# Patient Record
Sex: FEMALE | Race: WHITE | Employment: OTHER | URBAN - METROPOLITAN AREA
[De-identification: names, ages, dates, MRNs, and addresses within clinical notes are randomized per-mention and may not be internally consistent; named-entity substitution may affect disease eponyms.]

---

## 2017-03-23 ENCOUNTER — TELEPHONE (OUTPATIENT)
Dept: FAMILY MEDICINE CLINIC | Age: 72
End: 2017-03-23

## 2017-05-31 ENCOUNTER — TELEPHONE (OUTPATIENT)
Dept: FAMILY MEDICINE CLINIC | Age: 72
End: 2017-05-31

## 2017-07-10 ENCOUNTER — TELEPHONE (OUTPATIENT)
Dept: FAMILY MEDICINE CLINIC | Age: 72
End: 2017-07-10

## 2017-11-17 ENCOUNTER — TELEPHONE (OUTPATIENT)
Dept: FAMILY MEDICINE CLINIC | Age: 72
End: 2017-11-17

## 2017-11-17 NOTE — TELEPHONE ENCOUNTER
Dr Alfred Ornelas refused to sign [place] [employer] 54-FMA Medical Certification form.    Pt states she does not have a PCP because she has only seen dr Alfred Ornelas and again states that if this form is not filled out, then she will not have any power until the end of the month    Pt states she would come in to see dr Merrick Ramsay but she lives in [place] and is having car troubles    Pt asking dr Alfred Ornelas to reconsider     Form scanned under media

## 2017-11-17 NOTE — TELEPHONE ENCOUNTER
Patient called the office panicking because she has no power. [facility] faxed us over a form that needs to be completed and signed by Dr. Rupal Dhaliwal. Advised her that he is currently with patients and that we would give it to him to see if he would sign or not. States he is her PCP, patient states she will eventually make an appt with Dr. Arely Hampton. Gave forms to [facility] to ask Dr. Rupal Dhaliwal about. Advised patient we would contact her once completed or he refused to sign. Verbalized understanding.

## 2017-12-19 ENCOUNTER — TELEPHONE (OUTPATIENT)
Dept: FAMILY MEDICINE CLINIC | Age: 72
End: 2017-12-19

## 2017-12-19 DIAGNOSIS — J45.20 MILD INTERMITTENT ASTHMA WITHOUT COMPLICATION: ICD-10-CM

## 2017-12-19 RX ORDER — ALBUTEROL SULFATE 90 UG/1
2 AEROSOL, METERED RESPIRATORY (INHALATION) EVERY 6 HOURS PRN
Qty: 1 INHALER | Refills: 0 | Status: SHIPPED | OUTPATIENT
Start: 2017-12-19 | End: 2018-04-10 | Stop reason: SDUPTHER

## 2018-02-14 ENCOUNTER — TELEPHONE (OUTPATIENT)
Dept: FAMILY MEDICINE CLINIC | Age: 73
End: 2018-02-14

## 2018-04-10 ENCOUNTER — OFFICE VISIT (OUTPATIENT)
Dept: FAMILY MEDICINE CLINIC | Age: 73
End: 2018-04-10
Payer: MEDICARE

## 2018-04-10 VITALS
SYSTOLIC BLOOD PRESSURE: 125 MMHG | HEIGHT: 57 IN | DIASTOLIC BLOOD PRESSURE: 64 MMHG | WEIGHT: 133.6 LBS | TEMPERATURE: 99.5 F | OXYGEN SATURATION: 98 % | BODY MASS INDEX: 28.82 KG/M2 | HEART RATE: 86 BPM

## 2018-04-10 DIAGNOSIS — J45.20 MILD INTERMITTENT ASTHMA WITHOUT COMPLICATION: ICD-10-CM

## 2018-04-10 DIAGNOSIS — E55.9 VITAMIN D DEFICIENCY: ICD-10-CM

## 2018-04-10 DIAGNOSIS — E03.4 HYPOTHYROIDISM DUE TO ACQUIRED ATROPHY OF THYROID: ICD-10-CM

## 2018-04-10 DIAGNOSIS — R53.83 FATIGUE, UNSPECIFIED TYPE: Primary | ICD-10-CM

## 2018-04-10 DIAGNOSIS — E06.3 HASHIMOTO'S THYROIDITIS: ICD-10-CM

## 2018-04-10 DIAGNOSIS — R76.8 IGG GLIADIN ANTIBODY POSITIVE: ICD-10-CM

## 2018-04-10 DIAGNOSIS — E78.00 HYPERCHOLESTEROLEMIA: ICD-10-CM

## 2018-04-10 DIAGNOSIS — M16.0 PRIMARY OSTEOARTHRITIS OF BOTH HIPS: ICD-10-CM

## 2018-04-10 LAB
ALBUMIN SERPL-MCNC: 4.3 G/DL (ref 3.5–5.1)
ALP BLD-CCNC: 103 U/L (ref 38–126)
ALT SERPL-CCNC: 7 U/L (ref 11–66)
ANION GAP SERPL CALCULATED.3IONS-SCNC: 15 MEQ/L (ref 8–16)
AST SERPL-CCNC: 12 U/L (ref 5–40)
BASOPHILS # BLD: 0.7 %
BASOPHILS ABSOLUTE: 0 THOU/MM3 (ref 0–0.1)
BILIRUB SERPL-MCNC: 0.3 MG/DL (ref 0.3–1.2)
BILIRUBIN DIRECT: < 0.2 MG/DL (ref 0–0.3)
BUN BLDV-MCNC: 23 MG/DL (ref 7–22)
CALCIUM SERPL-MCNC: 9.6 MG/DL (ref 8.5–10.5)
CHLORIDE BLD-SCNC: 101 MEQ/L (ref 98–111)
CHOLESTEROL, TOTAL: 232 MG/DL (ref 100–199)
CO2: 25 MEQ/L (ref 23–33)
CREAT SERPL-MCNC: 0.7 MG/DL (ref 0.4–1.2)
EOSINOPHIL # BLD: 1.4 %
EOSINOPHILS ABSOLUTE: 0.1 THOU/MM3 (ref 0–0.4)
GFR SERPL CREATININE-BSD FRML MDRD: 82 ML/MIN/1.73M2
GLUCOSE BLD-MCNC: 104 MG/DL (ref 70–108)
HCT VFR BLD CALC: 41.7 % (ref 37–47)
HDLC SERPL-MCNC: 63 MG/DL
HEMOGLOBIN: 13.7 GM/DL (ref 12–16)
LDL CHOLESTEROL CALCULATED: 144 MG/DL
LYMPHOCYTES # BLD: 40.5 %
LYMPHOCYTES ABSOLUTE: 2.7 THOU/MM3 (ref 1–4.8)
MAGNESIUM: 2.1 MG/DL (ref 1.6–2.4)
MCH RBC QN AUTO: 29.6 PG (ref 27–31)
MCHC RBC AUTO-ENTMCNC: 32.9 GM/DL (ref 33–37)
MCV RBC AUTO: 89.8 FL (ref 81–99)
MONOCYTES # BLD: 8.1 %
MONOCYTES ABSOLUTE: 0.5 THOU/MM3 (ref 0.4–1.3)
NUCLEATED RED BLOOD CELLS: 0 /100 WBC
PDW BLD-RTO: 14.2 % (ref 11.5–14.5)
PLATELET # BLD: 308 THOU/MM3 (ref 130–400)
PMV BLD AUTO: 8.3 FL (ref 7.4–10.4)
POTASSIUM SERPL-SCNC: 4.1 MEQ/L (ref 3.5–5.2)
RBC # BLD: 4.64 MILL/MM3 (ref 4.2–5.4)
SEDIMENTATION RATE, ERYTHROCYTE: 14 MM/HR (ref 0–20)
SEG NEUTROPHILS: 49.3 %
SEGMENTED NEUTROPHILS ABSOLUTE COUNT: 3.3 THOU/MM3 (ref 1.8–7.7)
SODIUM BLD-SCNC: 141 MEQ/L (ref 135–145)
TOTAL PROTEIN: 7.8 G/DL (ref 6.1–8)
TRIGL SERPL-MCNC: 127 MG/DL (ref 0–199)
TSH SERPL DL<=0.05 MIU/L-ACNC: 6.2 UIU/ML (ref 0.4–4.2)
VITAMIN D 25-HYDROXY: 33 NG/ML (ref 30–100)
WBC # BLD: 6.6 THOU/MM3 (ref 4.8–10.8)

## 2018-04-10 PROCEDURE — 3017F COLORECTAL CA SCREEN DOC REV: CPT | Performed by: FAMILY MEDICINE

## 2018-04-10 PROCEDURE — G8427 DOCREV CUR MEDS BY ELIG CLIN: HCPCS | Performed by: FAMILY MEDICINE

## 2018-04-10 PROCEDURE — 99214 OFFICE O/P EST MOD 30 MIN: CPT | Performed by: FAMILY MEDICINE

## 2018-04-10 PROCEDURE — G8400 PT W/DXA NO RESULTS DOC: HCPCS | Performed by: FAMILY MEDICINE

## 2018-04-10 PROCEDURE — 3014F SCREEN MAMMO DOC REV: CPT | Performed by: FAMILY MEDICINE

## 2018-04-10 PROCEDURE — 1090F PRES/ABSN URINE INCON ASSESS: CPT | Performed by: FAMILY MEDICINE

## 2018-04-10 PROCEDURE — 1123F ACP DISCUSS/DSCN MKR DOCD: CPT | Performed by: FAMILY MEDICINE

## 2018-04-10 PROCEDURE — 36415 COLL VENOUS BLD VENIPUNCTURE: CPT | Performed by: FAMILY MEDICINE

## 2018-04-10 PROCEDURE — 1036F TOBACCO NON-USER: CPT | Performed by: FAMILY MEDICINE

## 2018-04-10 PROCEDURE — G8419 CALC BMI OUT NRM PARAM NOF/U: HCPCS | Performed by: FAMILY MEDICINE

## 2018-04-10 PROCEDURE — 4040F PNEUMOC VAC/ADMIN/RCVD: CPT | Performed by: FAMILY MEDICINE

## 2018-04-10 RX ORDER — ALBUTEROL SULFATE 90 UG/1
2 AEROSOL, METERED RESPIRATORY (INHALATION) EVERY 6 HOURS PRN
Qty: 1 INHALER | Refills: 0 | Status: SHIPPED | OUTPATIENT
Start: 2018-04-10 | End: 2018-07-10 | Stop reason: SDUPTHER

## 2018-04-10 ASSESSMENT — ENCOUNTER SYMPTOMS
TROUBLE SWALLOWING: 0
VOMITING: 0
DIARRHEA: 0
SHORTNESS OF BREATH: 1
ABDOMINAL PAIN: 0
BLOOD IN STOOL: 0
EYE PAIN: 0
NAUSEA: 0
CONSTIPATION: 0
COUGH: 1

## 2018-04-11 ENCOUNTER — TELEPHONE (OUTPATIENT)
Dept: FAMILY MEDICINE CLINIC | Age: 73
End: 2018-04-11

## 2018-04-11 LAB — THYROXINE (T4): 6.3 UG/DL (ref 4.5–12)

## 2018-04-12 LAB — DHEAS (DHEA SULFATE): 98 UG/DL (ref 10–90)

## 2018-04-13 LAB — ANA SCREEN: NORMAL

## 2018-04-14 LAB — DHEA UNCONJUGATED: 2.12 NG/ML (ref 0.63–4.7)

## 2018-07-10 DIAGNOSIS — R53.83 FATIGUE, UNSPECIFIED TYPE: ICD-10-CM

## 2018-07-10 DIAGNOSIS — J45.20 MILD INTERMITTENT ASTHMA WITHOUT COMPLICATION: ICD-10-CM

## 2018-07-11 RX ORDER — ALBUTEROL SULFATE 90 UG/1
2 AEROSOL, METERED RESPIRATORY (INHALATION) EVERY 6 HOURS PRN
Qty: 1 INHALER | Refills: 0 | Status: SHIPPED | OUTPATIENT
Start: 2018-07-11 | End: 2018-09-04 | Stop reason: SDUPTHER

## 2018-07-11 NOTE — TELEPHONE ENCOUNTER
Phone: [phone] [facility] Phone) [address] [facility] Phone: None,  Cant leave voicemail. Send my chart.

## 2018-07-11 NOTE — TELEPHONE ENCOUNTER
Phone: [phone] [facility] Phone) [address] [facility] Phone: None, SPOKE with Malina Bulalrd. Patient states no future questions or concerns at this time.

## 2018-08-22 ENCOUNTER — OFFICE VISIT (OUTPATIENT)
Dept: FAMILY MEDICINE CLINIC | Age: 73
End: 2018-08-22
Payer: MEDICARE

## 2018-08-22 ENCOUNTER — HOSPITAL ENCOUNTER (OUTPATIENT)
Age: 73
Discharge: HOME | End: 2018-08-22
Payer: MEDICARE

## 2018-08-22 ENCOUNTER — HOSPITAL ENCOUNTER (OUTPATIENT)
Dept: CT IMAGING | Age: 73
Discharge: HOME | End: 2018-08-22
Payer: MEDICARE

## 2018-08-22 VITALS
TEMPERATURE: 98.4 F | WEIGHT: 137 LBS | BODY MASS INDEX: 29.56 KG/M2 | OXYGEN SATURATION: 98 % | RESPIRATION RATE: 12 BRPM | DIASTOLIC BLOOD PRESSURE: 60 MMHG | HEART RATE: 69 BPM | SYSTOLIC BLOOD PRESSURE: 136 MMHG | HEIGHT: 57 IN

## 2018-08-22 DIAGNOSIS — R20.0 NUMBNESS AND TINGLING IN LEFT HAND: Primary | ICD-10-CM

## 2018-08-22 DIAGNOSIS — R00.2 HEART PALPITATIONS: ICD-10-CM

## 2018-08-22 DIAGNOSIS — R20.0 NUMBNESS AND TINGLING IN LEFT HAND: ICD-10-CM

## 2018-08-22 DIAGNOSIS — H53.9 VISION CHANGES: ICD-10-CM

## 2018-08-22 DIAGNOSIS — R94.31 ABNORMAL EKG: ICD-10-CM

## 2018-08-22 DIAGNOSIS — E78.2 MIXED HYPERLIPIDEMIA: ICD-10-CM

## 2018-08-22 DIAGNOSIS — R42 DIZZINESS: ICD-10-CM

## 2018-08-22 DIAGNOSIS — R20.0 NUMBNESS OF LEFT FOOT: ICD-10-CM

## 2018-08-22 DIAGNOSIS — R20.2 NUMBNESS AND TINGLING IN LEFT HAND: Primary | ICD-10-CM

## 2018-08-22 DIAGNOSIS — R20.2 NUMBNESS AND TINGLING IN LEFT HAND: ICD-10-CM

## 2018-08-22 DIAGNOSIS — R79.89 ELEVATED D-DIMER: ICD-10-CM

## 2018-08-22 LAB
ANION GAP SERPL CALCULATED.3IONS-SCNC: 14 MEQ/L (ref 8–16)
BASOPHILS # BLD: 0.9 %
BASOPHILS ABSOLUTE: 0.1 THOU/MM3 (ref 0–0.1)
BUN BLDV-MCNC: 13 MG/DL (ref 7–22)
CALCIUM SERPL-MCNC: 9 MG/DL (ref 8.5–10.5)
CHLORIDE BLD-SCNC: 102 MEQ/L (ref 98–111)
CHOLESTEROL, TOTAL: 235 MG/DL (ref 100–199)
CO2: 23 MEQ/L (ref 23–33)
CREAT SERPL-MCNC: 0.6 MG/DL (ref 0.4–1.2)
EOSINOPHIL # BLD: 1.8 %
EOSINOPHILS ABSOLUTE: 0.1 THOU/MM3 (ref 0–0.4)
ERYTHROCYTE [DISTWIDTH] IN BLOOD BY AUTOMATED COUNT: 13.9 % (ref 11.5–14.5)
ERYTHROCYTE [DISTWIDTH] IN BLOOD BY AUTOMATED COUNT: 46.3 FL (ref 35–45)
GFR SERPL CREATININE-BSD FRML MDRD: > 90 ML/MIN/1.73M2
GLUCOSE BLD-MCNC: 90 MG/DL (ref 70–108)
HCT VFR BLD CALC: 39.8 % (ref 37–47)
HDLC SERPL-MCNC: 66 MG/DL
HEMOGLOBIN: 13.5 GM/DL (ref 12–16)
IMMATURE GRANS (ABS): 0.02 THOU/MM3 (ref 0–0.07)
IMMATURE GRANULOCYTES: 0.3 %
LDL CHOLESTEROL CALCULATED: 153 MG/DL
LYMPHOCYTES # BLD: 41.6 %
LYMPHOCYTES ABSOLUTE: 2.9 THOU/MM3 (ref 1–4.8)
MAGNESIUM: 2 MG/DL (ref 1.6–2.4)
MCH RBC QN AUTO: 30.7 PG (ref 26–33)
MCHC RBC AUTO-ENTMCNC: 33.9 GM/DL (ref 32.2–35.5)
MCV RBC AUTO: 90.5 FL (ref 81–99)
MONOCYTES # BLD: 7.8 %
MONOCYTES ABSOLUTE: 0.5 THOU/MM3 (ref 0.4–1.3)
NUCLEATED RED BLOOD CELLS: 0 /100 WBC
PLATELET # BLD: 304 THOU/MM3 (ref 130–400)
PMV BLD AUTO: 10 FL (ref 9.4–12.4)
POTASSIUM SERPL-SCNC: 3.6 MEQ/L (ref 3.5–5.2)
RBC # BLD: 4.4 MILL/MM3 (ref 4.2–5.4)
SEG NEUTROPHILS: 47.6 %
SEGMENTED NEUTROPHILS ABSOLUTE COUNT: 3.3 THOU/MM3 (ref 1.8–7.7)
SODIUM BLD-SCNC: 139 MEQ/L (ref 135–145)
T4 FREE: 0.65 NG/DL (ref 0.93–1.76)
TRIGL SERPL-MCNC: 82 MG/DL (ref 0–199)
TSH SERPL DL<=0.05 MIU/L-ACNC: 11.17 UIU/ML (ref 0.4–4.2)
WBC # BLD: 7 THOU/MM3 (ref 4.8–10.8)

## 2018-08-22 PROCEDURE — G8510 SCR DEP NEG, NO PLAN REQD: HCPCS | Performed by: NURSE PRACTITIONER

## 2018-08-22 PROCEDURE — 84443 ASSAY THYROID STIM HORMONE: CPT

## 2018-08-22 PROCEDURE — 80048 BASIC METABOLIC PNL TOTAL CA: CPT

## 2018-08-22 PROCEDURE — 84439 ASSAY OF FREE THYROXINE: CPT

## 2018-08-22 PROCEDURE — 99214 OFFICE O/P EST MOD 30 MIN: CPT | Performed by: NURSE PRACTITIONER

## 2018-08-22 PROCEDURE — 85025 COMPLETE CBC W/AUTO DIFF WBC: CPT

## 2018-08-22 PROCEDURE — 86141 C-REACTIVE PROTEIN HS: CPT

## 2018-08-22 PROCEDURE — 70450 CT HEAD/BRAIN W/O DYE: CPT

## 2018-08-22 PROCEDURE — G8400 PT W/DXA NO RESULTS DOC: HCPCS | Performed by: NURSE PRACTITIONER

## 2018-08-22 PROCEDURE — 1036F TOBACCO NON-USER: CPT | Performed by: NURSE PRACTITIONER

## 2018-08-22 PROCEDURE — 83735 ASSAY OF MAGNESIUM: CPT

## 2018-08-22 PROCEDURE — 1123F ACP DISCUSS/DSCN MKR DOCD: CPT | Performed by: NURSE PRACTITIONER

## 2018-08-22 PROCEDURE — G8427 DOCREV CUR MEDS BY ELIG CLIN: HCPCS | Performed by: NURSE PRACTITIONER

## 2018-08-22 PROCEDURE — 4040F PNEUMOC VAC/ADMIN/RCVD: CPT | Performed by: NURSE PRACTITIONER

## 2018-08-22 PROCEDURE — 3017F COLORECTAL CA SCREEN DOC REV: CPT | Performed by: NURSE PRACTITIONER

## 2018-08-22 PROCEDURE — G8419 CALC BMI OUT NRM PARAM NOF/U: HCPCS | Performed by: NURSE PRACTITIONER

## 2018-08-22 PROCEDURE — 1090F PRES/ABSN URINE INCON ASSESS: CPT | Performed by: NURSE PRACTITIONER

## 2018-08-22 PROCEDURE — 36415 COLL VENOUS BLD VENIPUNCTURE: CPT

## 2018-08-22 PROCEDURE — 80061 LIPID PANEL: CPT

## 2018-08-22 PROCEDURE — 1101F PT FALLS ASSESS-DOCD LE1/YR: CPT | Performed by: NURSE PRACTITIONER

## 2018-08-22 PROCEDURE — 93000 ELECTROCARDIOGRAM COMPLETE: CPT | Performed by: NURSE PRACTITIONER

## 2018-08-22 ASSESSMENT — PATIENT HEALTH QUESTIONNAIRE - PHQ9
2. FEELING DOWN, DEPRESSED OR HOPELESS: 0
SUM OF ALL RESPONSES TO PHQ QUESTIONS 1-9: 0
SUM OF ALL RESPONSES TO PHQ QUESTIONS 1-9: 0
SUM OF ALL RESPONSES TO PHQ9 QUESTIONS 1 & 2: 0
1. LITTLE INTEREST OR PLEASURE IN DOING THINGS: 0

## 2018-08-22 NOTE — PATIENT INSTRUCTIONS
emergency department      Patient Education        Cardiac Arrhythmia: Care Instructions  Your Care Instructions    A cardiac arrhythmia is a change in the normal rhythm of the heart. Your heart may beat too fast or too slow or beat with an irregular or skipping rhythm. A change in the heart's rhythm may feel like a really strong heartbeat or a fluttering in your chest. A severe heart rhythm problem can keep the body from getting the blood it needs. This can result in shortness of breath, lightheadedness, and fainting. You may take medicine to treat your condition. Your doctor may recommend a pacemaker or recommend catheter ablation to destroy small parts of the heart that are causing a rhythm problem. Another possible treatment is an implantable cardioverter-defibrillator (ICD). An ICD is a device that gives the heart a shock to return the heart to a normal rhythm. Follow-up care is a key part of your treatment and safety. Be sure to make and go to all appointments, and call your doctor if you are having problems. It's also a good idea to know your test results and keep a list of the medicines you take. How can you care for yourself at home?  [facility]    · Be safe with medicines. Take your medicines exactly as prescribed. Call your doctor if you think you are having a problem with your medicine. You will get more details on the specific medicines your doctor prescribes.     · If you received a pacemaker or an ICD, you will get a fact sheet about it.     · Wear medical alert jewelry that says you have an abnormal heart rhythm. You can buy this at most drugstores.    Lifestyle changes    · Eat a heart-healthy diet.     · Stay at a healthy weight. Lose weight if you need to.     · Avoid nicotine, too much alcohol, and illegal drugs (meth, speed, and cocaine). Also, get enough sleep and do not overeat.     · Ask your doctor whether you can take over-the-counter medicines (such as decongestants).  These can make your heart beat fast.     · Talk to your doctor about any limits to activities, such as driving, or tasks where you use power tools or ladders. Activity    · Start light exercise if your doctor says you can. Even a small amount will help you get stronger, have more energy, and manage your stress.     · Get regular exercise. Try for 30 minutes on most days of the week. Ask your doctor what level of exercise is safe for you. If activity is not likely to cause health problems, you probably do not have limits on the type or level of activity that you can do. You may want to walk, swim, bike, or do other activities.     · When you exercise, watch for signs that your heart is working too hard. You are pushing too hard if you cannot talk while you exercise. If you become short of breath or dizzy or have chest pain, sit down and rest.     · Check your pulse daily. Place two fingers on the artery at the palm side of your wrist, in line with your thumb. If your heartbeat seems uneven, talk to your doctor. When should you call for help? Call 911 anytime you think you may need emergency care. For example, call if:    · You have symptoms of sudden heart failure. These may include:  ¨ Severe trouble breathing. ¨ A fast or irregular heartbeat. ¨ Coughing up pink, foamy mucus. ¨ You passed out.     · You have signs of a stroke. These include:  ¨ Sudden numbness, paralysis, or weakness in your face, arm, or leg, especially on only one side of your body. ¨ New problems with walking or balance. ¨ Sudden vision changes. ¨ Drooling or slurred speech. ¨ New problems speaking or understanding simple statements, or feeling confused. ¨ A sudden, severe headache that is different from past headaches.    Call your doctor now or seek immediate medical care if:    · You have new or changed symptoms of heart failure, such as:  ¨ New or increased shortness of breath. ¨ New or worse swelling in your legs, ankles, or feet.   ¨ Sudden weight gain, such as more than 2 to 3 pounds in a day or 5 pounds in a week. (Your doctor may suggest a different range of weight gain.)  ¨ Feeling dizzy or lightheaded or like you may faint. ¨ Feeling so tired or weak that you cannot do your usual activities. ¨ Not sleeping well. Shortness of breath wakes you at night. You need extra pillows to prop yourself up to breathe easier.    Watch closely for changes in your health, and be sure to contact your doctor if:    · You do not get better as expected. Where can you learn more? Go to https://chpepiceweb.Carmot Therapeutics. org and sign in to your Stretchr account. Enter F172 in the Talkspace box to learn more about \"Cardiac Arrhythmia: Care Instructions. \"     If you do not have an account, please click on the \"Sign Up Now\" link. Current as of: December 6, 2017  Content Version: 11.7  © 7954-5628 Verimed. Care instructions adapted under license by [facility] If you have questions about a medical condition or this instruction, always ask your healthcare professional. [place] [address] any warranty or liability for your use of this information. Patient Education        Dizziness: Care Instructions  Your Care Instructions  Dizziness is the feeling of unsteadiness or fuzziness in your head. It is different than having vertigo, which is a feeling that the room is spinning or that you are moving or falling. It is also different from lightheadedness, which is the feeling that you are about to faint. It can be hard to know what causes dizziness. Some people feel dizzy when they have migraine headaches. Sometimes bouts of flu can make you feel dizzy. Some medical conditions, such as heart problems or high blood pressure, can make you feel dizzy. Many medicines can cause dizziness, including medicines for high blood pressure, pain, or anxiety.   If a medicine causes your symptoms, your doctor may recommend that you walking or balance. ¨ A sudden, severe headache that is different from past headaches.    Call your doctor now or seek immediate medical care if:    · You feel dizzy and have a fever, headache, or ringing in your ears.     · You have new or increased nausea and vomiting.     · Your dizziness does not go away or comes back.    Watch closely for changes in your health, and be sure to contact your doctor if:    · You do not get better as expected. Where can you learn more? Go to https://DeeplinkpeTissue Regenix.[facility]inic [facility]. org and sign in to your Hoodin account. Enter I920 in the Plaxica box to learn more about \"Dizziness: Care Instructions. \"     If you do not have an account, please click on the \"Sign Up Now\" link. Current as of: November 20, 2017  Content Version: 11.7  © 3759-6841 Cell Gate USA. Care instructions adapted under license by [facility] ([facility]). If you have questions about a medical condition or this instruction, always ask your healthcare professional. [place] [address] any warranty or liability for your use of this information. Patient Education        High Cholesterol: Care Instructions  Your Care Instructions    Cholesterol is a type of fat in your blood. It is needed for many body functions, such as making new cells. Cholesterol is made by your body. It also comes from food you eat. High cholesterol means that you have too much of the fat in your blood. This raises your risk of a heart attack and stroke. LDL and HDL are part of your total cholesterol. LDL is the \"bad\" cholesterol. High LDL can raise your risk for heart disease, heart attack, and stroke. HDL is the \"good\" cholesterol. It helps clear bad cholesterol from the body. High HDL is linked with a lower risk of heart disease, heart attack, and stroke. Your cholesterol levels help your doctor find out your risk for having a heart attack or stroke.  You and your doctor can talk about whether you need to lower your risk and what treatment is best for you. A heart-healthy lifestyle along with medicines can help lower your cholesterol and your risk. The way you choose to lower your risk will depend on how high your risk is for heart attack and stroke. It will also depend on how you feel about taking medicines. Follow-up care is a key part of your treatment and safety. Be sure to make and go to all appointments, and call your doctor if you are having problems. It's also a good idea to know your test results and keep a list of the medicines you take. How can you care for yourself at home? · Eat a variety of foods every day. Good choices include fruits, vegetables, whole grains (like oatmeal), dried beans and peas, nuts and seeds, soy products (like tofu), and fat-free or low-fat dairy products. · Replace butter, margarine, and hydrogenated or partially hydrogenated oils with olive and canola oils. (Canola oil margarine without trans fat is fine.)  · Replace red meat with fish, poultry, and soy protein (like tofu). · Limit processed and packaged foods like chips, crackers, and cookies. · Bake, broil, or steam foods. Don't cagle them. · Be physically active. Get at least 30 minutes of exercise on most days of the week. Walking is a good choice. You also may want to do other activities, such as running, swimming, cycling, or playing tennis or team sports. · Stay at a healthy weight or lose weight by making the changes in eating and physical activity listed above. Losing just a small amount of weight, even 5 to 10 pounds, can reduce your risk for having a heart attack or stroke. · Do not smoke. When should you call for help? Watch closely for changes in your health, and be sure to contact your doctor if:    · You need help making lifestyle changes.     · You have questions about your medicine. Where can you learn more? Go to https://magda.healthCertify. org and sign in to your [employer] account.  Enter J730 in the [facility] box to learn more about \"High Cholesterol: Care Instructions. \"     If you do not have an account, please click on the \"Sign Up Now\" link. Current as of: May 10, 2017  Content Version: 11.7  © 0339-1303 VC4Africa, [email or web address]. Care instructions adapted under license by [facility] ([facility]). If you have questions about a medical condition or this instruction, always ask your healthcare professional. [address] any warranty or liability for your use of this information. Patient Education        Lightheadedness or Faintness: Care Instructions  Your Care Instructions  Lightheadedness is a feeling that you are about to faint or \"pass out. \" You do not feel as if you or your surroundings are moving. It is different from vertigo, which is the feeling that you or things around you are spinning or tilting. Lightheadedness usually goes away or gets better when you lie down. If lightheadedness gets worse, it can lead to a fainting spell. It is common to feel lightheaded from time to time. Lightheadedness usually is not caused by a serious problem. It often is caused by a short-lasting drop in blood pressure and blood flow to your head that occurs when you get up too quickly from a seated or lying position. Follow-up care is a key part of your treatment and safety. Be sure to make and go to all appointments, and call your doctor if you are having problems. It's also a good idea to know your test results and keep a list of the medicines you take. How can you care for yourself at home? · Lie down for 1 or 2 minutes when you feel lightheaded. After lying down, sit up slowly and remain sitting for 1 to 2 minutes before slowly standing up. · Avoid movements, positions, or activities that have made you lightheaded in the past.  · Get plenty of rest, especially if you have a cold or flu, which can cause lightheadedness.   · Make sure you drink plenty of fluids, especially if you have a fever or have been sweating. · Do not drive or put yourself and others in danger while you feel lightheaded. When should you call for help? Call 911 anytime you think you may need emergency care. For example, call if:    · You have symptoms of a stroke. These may include:  ¨ Sudden numbness, tingling, weakness, or loss of movement in your face, arm, or leg, especially on only one side of your body. ¨ Sudden vision changes. ¨ Sudden trouble speaking. ¨ Sudden confusion or trouble understanding simple statements. ¨ Sudden problems with walking or balance. ¨ A sudden, severe headache that is different from past headaches.     · You have symptoms of a heart attack. These may include:  ¨ Chest pain or pressure, or a strange feeling in the chest.  ¨ Sweating. ¨ Shortness of breath. ¨ Nausea or vomiting. ¨ Pain, pressure, or a strange feeling in the back, neck, jaw, or upper belly or in one or both shoulders or arms. ¨ Lightheadedness or sudden weakness. ¨ A fast or irregular heartbeat. After you call 911, the  may tell you to chew 1 adult-strength or 2 to 4 low-dose aspirin. Wait for an ambulance. Do not try to drive yourself.    Watch closely for changes in your health, and be sure to contact your doctor if:    · Your lightheadedness gets worse or does not get better with home care. Where can you learn more? Go to https://Wirecom TechnologiespeEvent 38 Unmanned Technology.EdgeWave Inc.. org and sign in to your [employer] Reed [employer]illery account. Enter P150 in the "Tapshot, Makers of Videokits"[facility] box to learn more about \"Lightheadedness or Faintness: Care Instructions. \"     If you do not have an account, please click on the \"Sign Up Now\" link. Current as of: November 20, 2017  Content Version: 11.7  © 9374-6630 BlueBat Games, Incorporated. Care instructions adapted under license by [facility] ([facility]).  If you have questions about a medical condition or this instruction, always ask your healthcare professional. [address] changes. ¨ Sudden trouble speaking. ¨ Sudden confusion or trouble understanding simple statements. ¨ Sudden problems with walking or balance. ¨ A sudden, severe headache that is different from past headaches.    Watch closely for changes in your health, and be sure to contact your doctor if you have any problems, or if:    · You do not get better as expected. Where can you learn more? Go to https://Ginger.iopechiragewcarlos.Reflectance Medical. org and sign in to your IntelligentMDx account. Enter Z401 in the Unique Solutions box to learn more about \"Numbness and Tingling: Care Instructions. \"     If you do not have an account, please click on the \"Sign Up Now\" link. Current as of: September 10, 2017  Content Version: 11.7  © 1962-8359 CamSemi, Incorporated. Care instructions adapted under license by [facility] ([facility]). If you have questions about a medical condition or this instruction, always ask your healthcare professional. [place] [address] any warranty or liability for your use of this information.

## 2018-08-22 NOTE — PROGRESS NOTES
[address] [address] [facility] [address] [facility]  Abhay Harvey  Dept: [phone]  Dept Fax: [phone]: [phone]    Visit Date: 8/22/2018    Lynda Mckeon is a 67 y.o. female who presents today for:  Chief Complaint   Patient presents with    Numbness     left arm, feet    Palpitations    Gait Problem     HPI:     Brandon Frankel has had left hand numbness for the past couple of weeks - this is there all the time now it is going into the top of her left foot into the lower leg starting yesterday. She has noticed that she is off balance - this occurs while she is walking/standing. This has been going on for a couple of months. She feels like her vision is changing, not blurry. This has been going on for the past week or so. She notices it more when she is trying to read. Denies headaches. No injury to the neck or upper extremities. She had times that she felt like she was blacking out - does not lose consciousness just feels like she is blacking out. Also, she is having palpitations. This occurs once every few weeks. Last episode was yesterday, will last a few minutes. Denies chest pain or shortness of breath. She is afraid that she had a mini stroke. HPI  Health Maintenance   Topic Date Due    DTaP/Tdap/Td vaccine (1 - Tdap) 02/09/1994    Breast cancer screen  08/26/1994    Shingles Vaccine (1 of 2 - 2 Dose Series) 08/26/1994    Colon cancer screen colonoscopy  08/26/1994    DEXA (modify frequency per FRAX score)  08/26/2009    Pneumococcal low/med risk (1 of 2 - PCV13) 08/26/2009    A1C test (Diabetic or Prediabetic)  12/10/2016    Flu vaccine (1) 09/01/2018    TSH testing  04/10/2019    Lipid screen  04/10/2023       No past medical history on file.    Past Surgical History:   Procedure Laterality Date    APPENDECTOMY      FOOT SURGERY Right     toothpick in foot    HYSTERECTOMY      TONSILLECTOMY       No family history on file. Social History   Substance Use Topics    Smoking status: Never Smoker    Smokeless tobacco: Never Used    Alcohol use No      Current Outpatient Prescriptions   Medication Sig Dispense Refill    albuterol sulfate  (90 Base) MCG/ACT inhaler Inhale 2 puffs into the lungs every 6 hours as needed for Wheezing 1 Inhaler 0    vitamin D (CHOLECALCIFEROL) 5000 UNITS CAPS capsule Take 5,000 Units by mouth daily      b complex vitamins capsule Take 1 capsule by mouth daily B-75 two phase by Solaray       MAGNESIUM CITRATE PO Take 200 mg by mouth 4 times daily (with meals and nightly) Must be TABLET form to not cause diarrhea, Vitacost, Vitamin Shoppe, Solgar, Now      b complex vitamins capsule Take 1 capsule by mouth daily B-12 plus by Mckenna Hurtado Cyanocobalamin (B-12 PO) Take 1 tablet by mouth daily b-12 energy berrymelt      NONFORMULARY Ultimate H.A. 3 capsules daily      NONFORMULARY AstaFX 2 tablets daily      Omega-3 Krill Oil 500 MG CAPS Take 2 tablets by mouth 3 times daily (before meals) [facility][id number] takes 1 cap 4 times a day for knee pain      Calcium Citrate-Vitamin D (CALCIUM CITRATE + PO) Take 2 tablets by mouth daily        No current facility-administered medications for this visit. No Known Allergies    Subjective:    Review of Systems    Objective:     Vitals:    08/22/18 1354   BP: 136/60   Site: Right Arm   Position: Sitting   Cuff Size: Medium Adult   Pulse: 69   Resp: 12   Temp: 98.4 °F (36.9 °C)   TempSrc: Oral   SpO2: 98%   Weight: 137 lb (62.1 kg)   Height: 4' 9.48\" (1.46 m)     Body mass index is 29.15 kg/m². Wt Readings from Last 3 Encounters:   08/22/18 137 lb (62.1 kg)   04/10/18 133 lb 9.6 oz (60.6 kg)   08/23/16 143 lb 6.4 oz (65 kg)     BP Readings from Last 3 Encounters:   08/22/18 136/60   04/10/18 125/64   08/23/16 101/63     Physical Exam   Constitutional: She is oriented to person, place, and time.  She appears well-developed and well-nourished. No distress. HENT:   Head: Normocephalic and atraumatic. Right Ear: Hearing and external ear normal. No swelling. Left Ear: Hearing and external ear normal. No swelling. Nose: No mucosal edema or rhinorrhea. Right sinus exhibits no maxillary sinus tenderness and no frontal sinus tenderness. Left sinus exhibits no maxillary sinus tenderness and no frontal sinus tenderness. Mouth/Throat: Oropharynx is clear and moist. No oropharyngeal exudate or posterior oropharyngeal erythema. Eyes: Pupils are equal, round, and reactive to light. Conjunctivae are normal. Right eye exhibits no discharge. Left eye exhibits no discharge. Neck: Normal range of motion. Cardiovascular:   No lower extremity edema   Pulmonary/Chest: Effort normal. No respiratory distress. Abdominal: She exhibits no distension. Musculoskeletal: She exhibits no tenderness or deformity. Full ROM of upper and lower extremities    Lymphadenopathy:     She has no cervical adenopathy. Neurological: She is alert and oriented to person, place, and time. Coordination and gait normal.   Skin: Skin is warm and dry. No rash noted. She is not diaphoretic. No cyanosis. Nails show no clubbing. Psychiatric: She has a normal mood and affect. Her speech is normal and behavior is normal. Judgment and thought content normal. Cognition and memory are normal.     Lab Results   Component Value Date    WBC 6.6 04/10/2018    HGB 13.7 04/10/2018    HCT 41.7 04/10/2018     04/10/2018    CHOL 232 (H) 04/10/2018    TRIG 127 04/10/2018    HDL 63 04/10/2018    LDLCALC 144 04/10/2018    AST 12 04/10/2018     04/10/2018    K 4.1 04/10/2018     04/10/2018    CREATININE 0.7 04/10/2018    BUN 23 (H) 04/10/2018    CO2 25 04/10/2018    TSH 6.200 (H) 04/10/2018    LABA1C 5.9 12/10/2015    LABGLOM 82 (A) 04/10/2018    MG 2.1 04/10/2018    CALCIUM 9.6 04/10/2018    VITD25 33 04/10/2018     Assessment:       Diagnosis Orders   1.  Numbness  CT HEAD WO CONTRAST    Basic Metabolic Panel    CBC Auto Differential    TSH with Reflex    Magnesium    High sensitivity CRP    Lipid Panel    D-Dimer, Quantitative    [facility] Zainab Roa MD    Holter Monitor 48 Hour    EKG 12 Lead     Medications Prescribed:  No orders of the defined types were placed in this encounter. Future Appointments  Date Time Provider Sukumar Yumiko   9/4/2018 2:00 PM DO CLAY Cifuentes [facility]   4/15/2019 1:00 PM DO CLAY Cifuentes  RES [address]        Patient given educational materials - see patient instructions. Discussed use, benefit, and side effects of prescribed medications. All patient questions answered. Pt voiced understanding. Reviewed health maintenance. Instructed to continue current medications, diet and exercise. Patient agreed with treatment plan. Follow up as directed.      Electronically signed by NAVI Lemus CNP on 8/22/2018 at 3:49 PM

## 2018-08-23 ENCOUNTER — TELEPHONE (OUTPATIENT)
Dept: FAMILY MEDICINE CLINIC | Age: 73
End: 2018-08-23

## 2018-08-23 DIAGNOSIS — E03.9 ACQUIRED HYPOTHYROIDISM: Primary | ICD-10-CM

## 2018-08-23 RX ORDER — LEVOTHYROXINE SODIUM 0.1 MG/1
100 TABLET ORAL DAILY
Qty: 30 TABLET | Refills: 1 | Status: SHIPPED | OUTPATIENT
Start: 2018-08-23 | End: 2018-09-04

## 2018-08-23 NOTE — TELEPHONE ENCOUNTER
Called Phone: [phone] (Mobile)Pref Phone: None,     Patients last appointment was : 8/22/2018  Patients next appointment is : 9/4/2018 - will keep to see Dr. Tyshawn Obando in 2 weeks. Edmundo Akhtar recommend verbally- The heart monitor, she will see Dr. Tyshawn Obando and see if she wants to continue with that order. Alistair Cast will wait the two weeks. Cholesterol:    · Eat heart-healthy foods. ? Eat fruits, vegetables, whole grains (like oatmeal), dried beans and peas, nuts and seeds, soy products (like tofu), and fat-free or low-fat dairy products. ? Replace butter, margarine, and hydrogenated or partially hydrogenated oils with olive and canola oils. (Canola oil margarine without trans fat is fine.)  ? Replace red meat with fish, poultry, and soy protein (like tofu). ? Limit processed and packaged foods like chips, crackers, and cookies. · Be active. Exercise can improve your cholesterol level. Get at least 30 minutes of exercise on most days of the week. Walking is a good choice. You also may want to do other activities, such as running, swimming, cycling, or playing tennis or team sports. · Stay at a healthy weight. Lose weight if you need to. · Don't smoke. If you need help quitting, talk to your doctor about stop-smoking programs and medicines. These can increase your chances of quitting for good.

## 2018-08-23 NOTE — TELEPHONE ENCOUNTER
Feliciano More would like a referral placed for nutrion. She has been following a low cholesterol diet, exercise 30 minutes daily, and can take an OTC fish oil. Please advise.

## 2018-08-23 NOTE — TELEPHONE ENCOUNTER
Called Phone: [phone] (Mobile)Pref Phone: None, spoke with Malina Bullard,  She states her family does not have cholesterol issues and that they are all dead and she cant ask and find out but her children have never complained of it. She will try to the low cholesterol and fish oil 4 times daily. She currently has fish oil 1200 mg 650  DHA and other 100. She stated in 2016 Dr. Nalini Brown did not have problem with that fish oil. She will finish the 3 bottle she owns and possible get the 1000 mg [facility] brand. She will take 2 capsule = 1,200 with each mean and 1 capsule at dinner = 600 = totaling 4200   . Patients last appointment was : 8/22/2018  Patients next appointment is : 9/4/2018  Patient states no future questions or concerns at this time.

## 2018-08-25 LAB — C-REACTIVE PROTEIN, HIGH SENSITIVITY: 5.8 MG/L

## 2018-09-04 ENCOUNTER — OFFICE VISIT (OUTPATIENT)
Dept: FAMILY MEDICINE CLINIC | Age: 73
End: 2018-09-04
Payer: MEDICARE

## 2018-09-04 VITALS
DIASTOLIC BLOOD PRESSURE: 64 MMHG | TEMPERATURE: 98.4 F | WEIGHT: 144.8 LBS | HEART RATE: 68 BPM | OXYGEN SATURATION: 98 % | HEIGHT: 57 IN | SYSTOLIC BLOOD PRESSURE: 126 MMHG | BODY MASS INDEX: 31.24 KG/M2

## 2018-09-04 DIAGNOSIS — J45.20 MILD INTERMITTENT ASTHMA WITHOUT COMPLICATION: ICD-10-CM

## 2018-09-04 DIAGNOSIS — Z78.0 POSTMENOPAUSAL: ICD-10-CM

## 2018-09-04 DIAGNOSIS — R20.0 NUMBNESS OF FOOT: ICD-10-CM

## 2018-09-04 DIAGNOSIS — Z78.0 POST-MENOPAUSAL: Primary | ICD-10-CM

## 2018-09-04 DIAGNOSIS — E03.9 ACQUIRED HYPOTHYROIDISM: ICD-10-CM

## 2018-09-04 DIAGNOSIS — L67.8 ABNORMAL FACIAL HAIR: ICD-10-CM

## 2018-09-04 DIAGNOSIS — Z13.820 SCREENING FOR OSTEOPOROSIS: ICD-10-CM

## 2018-09-04 DIAGNOSIS — R53.83 FATIGUE, UNSPECIFIED TYPE: ICD-10-CM

## 2018-09-04 PROCEDURE — 4040F PNEUMOC VAC/ADMIN/RCVD: CPT | Performed by: FAMILY MEDICINE

## 2018-09-04 PROCEDURE — 1090F PRES/ABSN URINE INCON ASSESS: CPT | Performed by: FAMILY MEDICINE

## 2018-09-04 PROCEDURE — 1101F PT FALLS ASSESS-DOCD LE1/YR: CPT | Performed by: FAMILY MEDICINE

## 2018-09-04 PROCEDURE — G8417 CALC BMI ABV UP PARAM F/U: HCPCS | Performed by: FAMILY MEDICINE

## 2018-09-04 PROCEDURE — 1036F TOBACCO NON-USER: CPT | Performed by: FAMILY MEDICINE

## 2018-09-04 PROCEDURE — G8427 DOCREV CUR MEDS BY ELIG CLIN: HCPCS | Performed by: FAMILY MEDICINE

## 2018-09-04 PROCEDURE — 3017F COLORECTAL CA SCREEN DOC REV: CPT | Performed by: FAMILY MEDICINE

## 2018-09-04 PROCEDURE — 99214 OFFICE O/P EST MOD 30 MIN: CPT | Performed by: FAMILY MEDICINE

## 2018-09-04 PROCEDURE — G8400 PT W/DXA NO RESULTS DOC: HCPCS | Performed by: FAMILY MEDICINE

## 2018-09-04 PROCEDURE — 1123F ACP DISCUSS/DSCN MKR DOCD: CPT | Performed by: FAMILY MEDICINE

## 2018-09-04 RX ORDER — LEVOTHYROXINE SODIUM 0.05 MG/1
50 TABLET ORAL DAILY
Qty: 30 TABLET | Refills: 3 | Status: SHIPPED | OUTPATIENT
Start: 2018-09-04 | End: 2018-11-27

## 2018-09-04 RX ORDER — LEVOTHYROXINE SODIUM 0.1 MG/1
100 TABLET ORAL DAILY
Qty: 30 TABLET | Refills: 5 | Status: CANCELLED | OUTPATIENT
Start: 2018-09-04

## 2018-09-04 RX ORDER — ALBUTEROL SULFATE 90 UG/1
2 AEROSOL, METERED RESPIRATORY (INHALATION) EVERY 6 HOURS PRN
Qty: 1 INHALER | Refills: 5 | Status: SHIPPED | OUTPATIENT
Start: 2018-09-04 | End: 2018-10-10 | Stop reason: SDUPTHER

## 2018-09-04 RX ORDER — CHLORAL HYDRATE 500 MG
3000 CAPSULE ORAL 4 TIMES DAILY
COMMUNITY

## 2018-09-04 ASSESSMENT — ENCOUNTER SYMPTOMS
DOUBLE VISION: 0
VOMITING: 0
ABDOMINAL PAIN: 0
SHORTNESS OF BREATH: 0
BLURRED VISION: 0
DIARRHEA: 0
COUGH: 0
WHEEZING: 1
CONSTIPATION: 1
NAUSEA: 0

## 2018-09-04 NOTE — PATIENT INSTRUCTIONS
1. You may receive a survey about your visit with us today. The feedback from our patients helps us identify what is working well and where the service to all patients can be enhanced. Thank you! 2. Please bring in ALL medications BOTTLES, including inhalers, herbal supplements, over the counter, prescribed & non-prescribed medicine. The office would like actual medication bottles and a list.  3. Please note our OFFICE POLICIES:  a. Prior to getting your labs drawn, please check with your insurance company for benefits and eligibility of lab services. Often, insurance companies cover certain tests for preventative visits only. It is patient's responsibility to see what is covered. b. We are unable to change a diagnosis after the test has been performed. c. Lab orders will not be re-printed. Please hold onto your original lab orders and take them to your lab to be completed. d. If you no show your schedule appointment three times, you be dismissed from this practice. e. Mertha Derek must be completed 24 hours prior to your schedule appointment. 4. If the list below has been completed, PLEASE FAX RECORDS TO OUR OFFICE @ [phone]. Once the records have been received we will update your records at our office. Health Maintenance Due   Topic Date Due    DTaP/Tdap/Td vaccine (1 - Tdap) 02/09/1994    Breast cancer screen  08/26/1994    Shingles Vaccine (1 of 2 - 2 Dose Series) 08/26/1994    Colon cancer screen colonoscopy  08/26/1994    DEXA (modify frequency per FRAX score)  08/26/2009    Pneumococcal low/med risk (1 of 2 - PCV13) 08/26/2009    A1C test (Diabetic or Prediabetic)  12/10/2016    Flu vaccine (1) 09/01/2018      Neuro consult?   Levothyroxine for the thyroid    will get the xr of the lumbar spine    Will get some hormone levels for the hair on the face     Will see you back in a month to 6 weeks to check on the thyroid

## 2018-09-04 NOTE — PROGRESS NOTES
Pharmacy Medication History Note      List of current medications patient is taking is complete. Source of information: Patient    Medications removed (include reason, ex. therapy complete or physician discontinued): · Synthroid -pt states she has never taken it  · Krill Oil    Medications added/doses adjusted:  · She takes 10,000 unit of vitamin D daily      Other notes (ex. Recent course of antibiotics, Coumadin dosing):  · Pt states she has only been taking Fish Oil and Vitamin D for the past 2 to 3 weeks  · Denies use of other OTC or herbal medications. Allergies reviewed    Recommendations:   Pt is wondering when/if she needs to continue on vitamins. She states she really hasn't taken any of the vitamins. She has had hot flashes the past couple weeks but states \"no reason\" for them. She tries to avoid soy and gluten but does occasionally have bread or pasta. Fish oil has been making her feel \"full\" even if she hasn't ate anything.      [address], Pharm D, BCPS, BCACP, BC-ADM 9/4/2018 2:55 PM

## 2018-09-04 NOTE — PROGRESS NOTES
Josy Dougherty is a 75 y.o. female who presents today for:  Chief Complaint   Patient presents with    Results     CT AND HOLTER    Thyroid Problem       Goals      Exercise 3x per week (30 min per time)           HPI:     HPI: Patient presents for follow up on her ulnar nerve. She reports numbness on the top of her foot now. Still needs to have HOLTER monitoring. Patient is tired. And reports some dry skin. Does not remember anyone telling her she has any thyroid medications     She does not want to be on meds if she can help it - afraid of the things they put in the medications    She has been under a lot of stress this past year and she is pretty tired    The tingling in the hand is just starting to be worse    Feeling the numbness in the fingers and the top of the foot - feels numb on the top of the foot    No question data found. No past medical history on file. Past Surgical History:   Procedure Laterality Date    APPENDECTOMY      FOOT SURGERY Right     toothpick in foot    HYSTERECTOMY      TONSILLECTOMY       No family history on file.   Social History   Substance Use Topics    Smoking status: Never Smoker    Smokeless tobacco: Never Used    Alcohol use No      Current Outpatient Prescriptions   Medication Sig Dispense Refill    albuterol sulfate  (90 Base) MCG/ACT inhaler Inhale 2 puffs into the lungs every 6 hours as needed for Wheezing 1 Inhaler 5    Cinnamon 500 MG TABS Take 1,000 mg by mouth daily      Omega-3 Fatty Acids (FISH OIL) 1000 MG CAPS Take 2,000 mg by mouth 4 times daily      levothyroxine (SYNTHROID) 50 MCG tablet Take 1 tablet by mouth Daily 30 tablet 3    vitamin D (CHOLECALCIFEROL) 5000 UNITS CAPS capsule Take 10,000 Units by mouth daily       b complex vitamins capsule Take 1 capsule by mouth daily B-75 two phase by Noe Nance b complex vitamins capsule Take 1 capsule by mouth daily B-12 plus by Savage [place] Cyanocobalamin (B-12 PO) Take 1 tablet Objective:     Vitals:    09/04/18 1427   BP: 126/64   Site: Left Arm   Position: Sitting   Cuff Size: Medium Adult   Pulse: 68   Temp: 98.4 °F (36.9 °C)   TempSrc: Oral   SpO2: 98%   Weight: 144 lb 12.8 oz (65.7 kg)   Height: 4' 8.69\" (1.44 m)       Body mass index is 31.67 kg/m². Wt Readings from Last 3 Encounters:   09/04/18 144 lb 12.8 oz (65.7 kg)   08/22/18 137 lb (62.1 kg)   04/10/18 133 lb 9.6 oz (60.6 kg)     BP Readings from Last 3 Encounters:   09/04/18 126/64   08/22/18 136/60   04/10/18 125/64       Physical Exam   Constitutional: She is oriented to person, place, and time. She appears well-developed and well-nourished. No distress. HENT:   Head: Normocephalic and atraumatic. Right Ear: External ear normal.   Left Ear: External ear normal.   Eyes: Conjunctivae and EOM are normal. Right eye exhibits no discharge. Left eye exhibits no discharge. No scleral icterus. Neck: Normal range of motion. Cardiovascular: Normal rate, regular rhythm and normal heart sounds. No murmur heard. Pulmonary/Chest: Effort normal and breath sounds normal.   Musculoskeletal: She exhibits no edema. Neurological: She is alert and oriented to person, place, and time. No cranial nerve deficit. Skin: Skin is warm and dry. No rash noted. She is not diaphoretic. No erythema. Psychiatric: She has a normal mood and affect. Her behavior is normal. Judgment and thought content normal.   Nursing note and vitals reviewed. Physical Exam   Constitutional: She is oriented to person, place, and time. She appears well-developed and well-nourished. No distress. HENT:   Head: Normocephalic and atraumatic. Right Ear: External ear normal.   Left Ear: External ear normal.   Eyes: Conjunctivae and EOM are normal. Right eye exhibits no discharge. Left eye exhibits no discharge. No scleral icterus. Neck: Normal range of motion. Cardiovascular: Normal rate, regular rhythm and normal heart sounds.     No murmur heard.  Pulmonary/Chest: Effort normal and breath sounds normal.   Musculoskeletal: She exhibits no edema. Neurological: She is alert and oriented to person, place, and time. No cranial nerve deficit. Skin: Skin is warm and dry. No rash noted. She is not diaphoretic. No erythema. Psychiatric: She has a normal mood and affect. Her behavior is normal. Judgment and thought content normal.   Nursing note and vitals reviewed. Lab Results   Component Value Date    WBC 7.0 08/22/2018    HGB 13.5 08/22/2018    HCT 39.8 08/22/2018     08/22/2018    CHOL 235 (H) 08/22/2018    TRIG 82 08/22/2018    HDL 66 08/22/2018    LDLCALC 153 08/22/2018    AST 12 04/10/2018     08/22/2018    K 3.6 08/22/2018     08/22/2018    CREATININE 0.6 08/22/2018    BUN 13 08/22/2018    CO2 23 08/22/2018    TSH 11.170 (H) 08/22/2018    LABA1C 5.9 12/10/2015    LABGLOM >90 08/22/2018    MG 2.0 08/22/2018    CALCIUM 9.0 08/22/2018    VITD25 33 04/10/2018       Imaging Results:    Ct Head Wo Contrast    Result Date: 8/22/2018  PROCEDURE: CT HEAD WO CONTRAST CLINICAL INFORMATION: Left hand numbness and tingling, dizziness TECHNIQUE: CT scan of the head was performed from the vertex to the skull base without use of intravenous contrast. All CT scans at this facility use dose modulation, iterative reconstruction, and/or weight-based dosing when appropriate to reduce radiation dose to as low as reasonably achievable. COMPARISON: None. FINDINGS: There is no mass effect, midline shift or acute hemorrhage. Ventricles and CSF spaces are within normal limits. Gray-white matter differentiation is preserved. Visualized orbits, paranasal sinuses and mastoid air cells are unremarkable. No mass effect or acute hemorrhage. **This report has been created using voice recognition software. It may contain minor errors which are inherent in voice recognition technology. ** Final report electronically signed by Dr. Jaren Redmond on 8/22/2018 4:05 PM        Assessment:      Diagnosis Orders   1. Post-menopausal     2. Mild intermittent asthma without complication  albuterol sulfate  (90 Base) MCG/ACT inhaler   3. Fatigue, unspecified type  albuterol sulfate  (90 Base) MCG/ACT inhaler   4. Acquired hypothyroidism  T4    TSH without Reflex   5. Numbness of foot  XR LUMBAR SPINE (2-3 VIEWS)   6. Abnormal facial hair  DHEA    DHEA-Sulfate    Testosterone Free and Total, Non-Male    Estrogens, Fractionated   7. Screening for osteoporosis  DEXA BONE DENSITY PERIPHERAL   8. Postmenopausal  DEXA BONE DENSITY PERIPHERAL   Numbness in left hand, and on left foot  hypothyroidism    Plan:    Neuro consult? Levothyroxine for the thyroid    will get the xr of the lumbar spine    Will get some hormone levels for the hair on the face     Will see you back in a month to 6 weeks to check on the thyroid           No Follow-up on file. Orders Placed:  Orders Placed This Encounter   Procedures    XR LUMBAR SPINE (2-3 VIEWS)    DEXA BONE DENSITY PERIPHERAL    DHEA    DHEA-Sulfate    Testosterone Free and Total, Non-Male    Estrogens, Fractionated    T4    TSH without Reflex     Medications Prescribed:  Orders Placed This Encounter   Medications    albuterol sulfate  (90 Base) MCG/ACT inhaler     Sig: Inhale 2 puffs into the lungs every 6 hours as needed for Wheezing     Dispense:  1 Inhaler     Refill:  5    levothyroxine (SYNTHROID) 50 MCG tablet     Sig: Take 1 tablet by mouth Daily     Dispense:  30 tablet     Refill:  3       Future Appointments  Date Time Provider Sukumar Calderon   10/9/2018 1:15 PM Soren German MD [facility] [address] [facility]   4/15/2019 1:00 PM Louisa Bloch, DO [facility] [place] [id number] [facility]       Patient given educational materials - see patient instructions. Discussed use, benefit, and side effects of prescribed medications. All patient questions answered. Pt voiced understanding. Reviewed health maintenance. Instructed to continue current medications, diet and exercise. Patient agreed with treatment plan. Follow up as directed.      Electronically signed by Radha Silva DO on 9/4/2018 at 3:35 PM

## 2018-09-05 ENCOUNTER — TELEPHONE (OUTPATIENT)
Dept: FAMILY MEDICINE CLINIC | Age: 73
End: 2018-09-05

## 2018-10-09 ENCOUNTER — TELEPHONE (OUTPATIENT)
Dept: FAMILY MEDICINE CLINIC | Age: 73
End: 2018-10-09

## 2018-10-09 ENCOUNTER — OFFICE VISIT (OUTPATIENT)
Dept: CARDIOLOGY CLINIC | Age: 73
End: 2018-10-09
Payer: MEDICARE

## 2018-10-09 ENCOUNTER — HOSPITAL ENCOUNTER (OUTPATIENT)
Dept: WOMENS IMAGING | Age: 73
Discharge: HOME | End: 2018-10-09
Payer: MEDICARE

## 2018-10-09 VITALS
HEART RATE: 85 BPM | BODY MASS INDEX: 29.23 KG/M2 | WEIGHT: 145 LBS | DIASTOLIC BLOOD PRESSURE: 70 MMHG | HEIGHT: 59 IN | SYSTOLIC BLOOD PRESSURE: 138 MMHG

## 2018-10-09 DIAGNOSIS — R20.0 ARM NUMBNESS: Primary | ICD-10-CM

## 2018-10-09 DIAGNOSIS — R94.31 ABNORMAL EKG: ICD-10-CM

## 2018-10-09 DIAGNOSIS — R06.02 SOB (SHORTNESS OF BREATH): ICD-10-CM

## 2018-10-09 DIAGNOSIS — R20.0 LEFT ARM NUMBNESS: ICD-10-CM

## 2018-10-09 DIAGNOSIS — E78.00 HYPERCHOLESTEROLEMIA: ICD-10-CM

## 2018-10-09 DIAGNOSIS — Z78.0 POSTMENOPAUSAL: ICD-10-CM

## 2018-10-09 DIAGNOSIS — Z13.820 SCREENING FOR OSTEOPOROSIS: ICD-10-CM

## 2018-10-09 DIAGNOSIS — R00.2 INTERMITTENT PALPITATIONS: Primary | ICD-10-CM

## 2018-10-09 PROCEDURE — 77080 DXA BONE DENSITY AXIAL: CPT

## 2018-10-09 PROCEDURE — 1036F TOBACCO NON-USER: CPT | Performed by: INTERNAL MEDICINE

## 2018-10-09 PROCEDURE — 4040F PNEUMOC VAC/ADMIN/RCVD: CPT | Performed by: INTERNAL MEDICINE

## 2018-10-09 PROCEDURE — G8417 CALC BMI ABV UP PARAM F/U: HCPCS | Performed by: INTERNAL MEDICINE

## 2018-10-09 PROCEDURE — 99204 OFFICE O/P NEW MOD 45 MIN: CPT | Performed by: INTERNAL MEDICINE

## 2018-10-09 PROCEDURE — G8427 DOCREV CUR MEDS BY ELIG CLIN: HCPCS | Performed by: INTERNAL MEDICINE

## 2018-10-09 PROCEDURE — 1123F ACP DISCUSS/DSCN MKR DOCD: CPT | Performed by: INTERNAL MEDICINE

## 2018-10-09 PROCEDURE — G8399 PT W/DXA RESULTS DOCUMENT: HCPCS | Performed by: INTERNAL MEDICINE

## 2018-10-09 PROCEDURE — G8484 FLU IMMUNIZE NO ADMIN: HCPCS | Performed by: INTERNAL MEDICINE

## 2018-10-09 PROCEDURE — 1090F PRES/ABSN URINE INCON ASSESS: CPT | Performed by: INTERNAL MEDICINE

## 2018-10-09 PROCEDURE — 1101F PT FALLS ASSESS-DOCD LE1/YR: CPT | Performed by: INTERNAL MEDICINE

## 2018-10-09 PROCEDURE — 3017F COLORECTAL CA SCREEN DOC REV: CPT | Performed by: INTERNAL MEDICINE

## 2018-10-10 DIAGNOSIS — J45.20 MILD INTERMITTENT ASTHMA WITHOUT COMPLICATION: ICD-10-CM

## 2018-10-10 DIAGNOSIS — R53.83 FATIGUE, UNSPECIFIED TYPE: ICD-10-CM

## 2018-10-11 ENCOUNTER — TELEPHONE (OUTPATIENT)
Dept: FAMILY MEDICINE CLINIC | Age: 73
End: 2018-10-11

## 2018-10-11 RX ORDER — ALBUTEROL SULFATE 90 UG/1
2 AEROSOL, METERED RESPIRATORY (INHALATION) EVERY 6 HOURS PRN
Qty: 1 INHALER | Refills: 5 | Status: SHIPPED | OUTPATIENT
Start: 2018-10-11 | End: 2019-05-16 | Stop reason: SDUPTHER

## 2018-10-11 NOTE — TELEPHONE ENCOUNTER
Called patient and let her know of the results. She would like to come in and discuss her results and possible medications. Next appt: 10/24/18    No further concerns or questions at this time.

## 2018-10-11 NOTE — TELEPHONE ENCOUNTER
----- Message from Richard Carlson DO sent at 10/11/2018 10:03 AM EDT -----  The dexa scan does show osteoporosis which is thinning of the bones. Do you want to come in to discus? We can add a medication fosamax or prolia to help the bones get stronger.   Continue to take the vitamin D and the calcium and that will help but we may want something stronger

## 2018-10-17 ENCOUNTER — PATIENT MESSAGE (OUTPATIENT)
Dept: OTHER | Facility: CLINIC | Age: 73
End: 2018-10-17

## 2018-10-17 ENCOUNTER — PATIENT MESSAGE (OUTPATIENT)
Dept: FAMILY MEDICINE CLINIC | Age: 73
End: 2018-10-17

## 2018-10-24 ENCOUNTER — HOSPITAL ENCOUNTER (OUTPATIENT)
Dept: NON INVASIVE DIAGNOSTICS | Age: 73
Discharge: HOME | End: 2018-10-24
Payer: MEDICARE

## 2018-10-24 ENCOUNTER — OFFICE VISIT (OUTPATIENT)
Dept: FAMILY MEDICINE CLINIC | Age: 73
End: 2018-10-24
Payer: MEDICARE

## 2018-10-24 VITALS
WEIGHT: 150.6 LBS | SYSTOLIC BLOOD PRESSURE: 134 MMHG | TEMPERATURE: 97.6 F | DIASTOLIC BLOOD PRESSURE: 66 MMHG | HEIGHT: 57 IN | HEART RATE: 84 BPM | OXYGEN SATURATION: 97 % | BODY MASS INDEX: 32.49 KG/M2

## 2018-10-24 DIAGNOSIS — M81.0 AGE-RELATED OSTEOPOROSIS WITHOUT CURRENT PATHOLOGICAL FRACTURE: Primary | ICD-10-CM

## 2018-10-24 DIAGNOSIS — H61.21 IMPACTED CERUMEN OF RIGHT EAR: ICD-10-CM

## 2018-10-24 DIAGNOSIS — R06.02 SOB (SHORTNESS OF BREATH): ICD-10-CM

## 2018-10-24 DIAGNOSIS — R00.2 INTERMITTENT PALPITATIONS: ICD-10-CM

## 2018-10-24 DIAGNOSIS — E78.00 HYPERCHOLESTEROLEMIA: ICD-10-CM

## 2018-10-24 DIAGNOSIS — R94.31 ABNORMAL EKG: ICD-10-CM

## 2018-10-24 DIAGNOSIS — R20.0 LEFT ARM NUMBNESS: ICD-10-CM

## 2018-10-24 DIAGNOSIS — M25.561 CHRONIC PAIN OF BOTH KNEES: ICD-10-CM

## 2018-10-24 DIAGNOSIS — G89.29 CHRONIC PAIN OF BOTH KNEES: ICD-10-CM

## 2018-10-24 DIAGNOSIS — M25.562 CHRONIC PAIN OF BOTH KNEES: ICD-10-CM

## 2018-10-24 LAB
LV EF: 60 %
LVEF MODALITY: NORMAL

## 2018-10-24 PROCEDURE — 3430000000 HC RX DIAGNOSTIC RADIOPHARMACEUTICAL: Performed by: INTERNAL MEDICINE

## 2018-10-24 PROCEDURE — 93017 CV STRESS TEST TRACING ONLY: CPT | Performed by: INTERNAL MEDICINE

## 2018-10-24 PROCEDURE — 78452 HT MUSCLE IMAGE SPECT MULT: CPT | Performed by: INTERNAL MEDICINE

## 2018-10-24 PROCEDURE — G8427 DOCREV CUR MEDS BY ELIG CLIN: HCPCS | Performed by: NURSE PRACTITIONER

## 2018-10-24 PROCEDURE — 1101F PT FALLS ASSESS-DOCD LE1/YR: CPT | Performed by: NURSE PRACTITIONER

## 2018-10-24 PROCEDURE — G8399 PT W/DXA RESULTS DOCUMENT: HCPCS | Performed by: NURSE PRACTITIONER

## 2018-10-24 PROCEDURE — G8484 FLU IMMUNIZE NO ADMIN: HCPCS | Performed by: NURSE PRACTITIONER

## 2018-10-24 PROCEDURE — A9500 TC99M SESTAMIBI: HCPCS | Performed by: INTERNAL MEDICINE

## 2018-10-24 PROCEDURE — 2709999900 HC NON-CHARGEABLE SUPPLY

## 2018-10-24 PROCEDURE — 93225 XTRNL ECG REC<48 HRS REC: CPT

## 2018-10-24 PROCEDURE — 1036F TOBACCO NON-USER: CPT | Performed by: NURSE PRACTITIONER

## 2018-10-24 PROCEDURE — 1090F PRES/ABSN URINE INCON ASSESS: CPT | Performed by: NURSE PRACTITIONER

## 2018-10-24 PROCEDURE — 93226 XTRNL ECG REC<48 HR SCAN A/R: CPT

## 2018-10-24 PROCEDURE — 99214 OFFICE O/P EST MOD 30 MIN: CPT | Performed by: NURSE PRACTITIONER

## 2018-10-24 PROCEDURE — 6360000002 HC RX W HCPCS

## 2018-10-24 PROCEDURE — 69210 REMOVE IMPACTED EAR WAX UNI: CPT | Performed by: NURSE PRACTITIONER

## 2018-10-24 PROCEDURE — 93306 TTE W/DOPPLER COMPLETE: CPT

## 2018-10-24 PROCEDURE — 1123F ACP DISCUSS/DSCN MKR DOCD: CPT | Performed by: NURSE PRACTITIONER

## 2018-10-24 PROCEDURE — 3017F COLORECTAL CA SCREEN DOC REV: CPT | Performed by: NURSE PRACTITIONER

## 2018-10-24 PROCEDURE — 4040F PNEUMOC VAC/ADMIN/RCVD: CPT | Performed by: NURSE PRACTITIONER

## 2018-10-24 PROCEDURE — G8417 CALC BMI ABV UP PARAM F/U: HCPCS | Performed by: NURSE PRACTITIONER

## 2018-10-24 RX ORDER — ALENDRONATE SODIUM 70 MG/1
70 TABLET ORAL
Qty: 4 TABLET | Refills: 3 | Status: SHIPPED | OUTPATIENT
Start: 2018-10-24 | End: 2019-05-16

## 2018-10-24 RX ADMIN — Medication 31.9 MILLICURIE: at 13:20

## 2018-10-24 RX ADMIN — Medication 9.4 MILLICURIE: at 12:20

## 2018-10-24 ASSESSMENT — ENCOUNTER SYMPTOMS
EYE REDNESS: 0
CONSTIPATION: 0
COLOR CHANGE: 0
ANAL BLEEDING: 0
EYE DISCHARGE: 0
SORE THROAT: 0
ABDOMINAL DISTENTION: 0
NAUSEA: 0
DIARRHEA: 0
COUGH: 0
SHORTNESS OF BREATH: 0
BLOOD IN STOOL: 0
RHINORRHEA: 0
ABDOMINAL PAIN: 0

## 2018-10-24 NOTE — PROGRESS NOTES
Health Maintenance Due   Topic Date Due    DTaP/Tdap/Td vaccine (1 - Tdap) 02/09/1994  refused     Breast cancer screen  08/26/1994 refused     Shingles Vaccine (1 of 2 - 2 Dose Series) 08/26/1994 refused     Colon cancer screen colonoscopy  08/26/1994 refused     Pneumococcal low/med risk (1 of 2 - PCV13) 08/26/2009 refused     A1C test (Diabetic or Prediabetic)  12/10/2016 refused     Flu vaccine (1) 09/01/2018 refused

## 2018-10-24 NOTE — PATIENT INSTRUCTIONS
1. You may receive a survey about your visit with us today. The feedback from our patients helps us identify what is working well and where the service to all patients can be enhanced. Thank you! 2. Please bring in ALL medications BOTTLES, including inhalers, herbal supplements, over the counter, prescribed & non-prescribed medicine. The office would like actual medication bottles and a list.  3. Please note our OFFICE POLICIES:  a. Prior to getting your labs drawn, please check with your insurance company for benefits and eligibility of lab services. Often, insurance companies cover certain tests for preventative visits only. It is patient's responsibility to see what is covered. b. We are unable to change a diagnosis after the test has been performed. c. Lab orders will not be re-printed. Please hold onto your original lab orders and take them to your lab to be completed. d. If you no show your schedule appointment three times, you be dismissed from this practice. e. Abbey Colin must be completed 24 hours prior to your schedule appointment. 4. If the list below has been completed, PLEASE FAX RECORDS TO OUR OFFICE @ [phone]. Once the records have been received we will update your records at our office. Health Maintenance Due   Topic Date Due    DTaP/Tdap/Td vaccine (1 - Tdap) 02/09/1994    Breast cancer screen  08/26/1994    Shingles Vaccine (1 of 2 - 2 Dose Series) 08/26/1994    Colon cancer screen colonoscopy  08/26/1994    Pneumococcal low/med risk (1 of 2 - PCV13) 08/26/2009    A1C test (Diabetic or Prediabetic)  12/10/2016    Flu vaccine (1) 09/01/2018       Schedule your 2018 flu vaccine with Dr. Tulio Lakhani call [phone]!   [facility], for anyone 9 years or older   [address]   Every Monday   8:00am-11:00am and 1:00pm-3:00pm    Will get xrays of the knees  Will order the Fosamax - you can discuss with your pharmacist  Will flush the right ear.     · Your ears are ringing or feel full.     · You have a loss of hearing.    Watch closely for changes in your health, and be sure to contact your doctor if:    · You have pain or reduced hearing after 1 week of home treatment.     · You have any new symptoms, such as nausea or balance problems. Where can you learn more? Go to https://chpepiceweb.ItsMyURLs. org and sign in to your Summon account. Enter E976 in the Fididel box to learn more about \"Earwax Blockage: Care Instructions. \"     If you do not have an account, please click on the \"Sign Up Now\" link. Current as of: November 20, 2017  Content Version: 11.7  © 0785-2284 [employer]. Care instructions adapted under license by [facility] ([facility]). If you have questions about a medical condition or this instruction, always ask your healthcare professional. [place] [address] any warranty or liability for your use of this information. Patient Education        Chronic Pain: Care Instructions  Your Care Instructions    Chronic pain is pain that lasts a long time (months or even years) and may or may not have a clear cause. It is different from acute pain, which usually does have a clear cause-like an injury or illness-and gets better over time. Chronic pain:  · Lasts over time but may vary from day to day. · Does not go away despite efforts to end it. · May disrupt your sleep and lead to fatigue. · May cause depression or anxiety. · May make your muscles tense, causing more pain. · Can disrupt your work, hobbies, home life, and relationships with friends and family. Chronic pain is a very real condition. It is not just in your head. Treatment can help and usually includes several methods used together, such as medicines, physical therapy, exercise, and other treatments. Learning how to relax and changing negative thought patterns can also help you cope. Chronic pain is complex.  Taking an active role Version: 11.7  © 6218-9182 [employer]. Care instructions adapted under license by [facility] ([facility]). If you have questions about a medical condition or this instruction, always ask your healthcare professional. [address] any warranty or liability for your use of this information. Patient Education        Musculoskeletal Pain: Care Instructions  Your Care Instructions    Different problems with the bones, muscles, nerves, ligaments, and tendons in the body can cause pain. One or more areas of your body may ache or burn. Or they may feel tired, stiff, or sore. The medical term for this type of pain is musculoskeletal pain. It can have many different causes. Sometimes the pain is caused by an injury such as a strain or sprain. Or you might have pain from using one part of your body in the same way over and over again. This is called overuse. In some cases, the cause of the pain is another health problem such as arthritis or fibromyalgia. The doctor will examine you and ask you questions about your health to help find the cause of your pain. Blood tests or imaging tests like an X-ray may also be helpful. But sometimes doctors can't find a cause of the pain. Treatment depends on your symptoms and the cause of the pain, if known. The doctor has checked you carefully, but problems can develop later. If you notice any problems or new symptoms, get medical treatment right away. Follow-up care is a key part of your treatment and safety. Be sure to make and go to all appointments, and call your doctor if you are having problems. It's also a good idea to know your test results and keep a list of the medicines you take. How can you care for yourself at home? · Rest until you feel better. · Do not do anything that makes the pain worse. Return to exercise gradually if you feel better and your doctor says it's okay. · Be safe with medicines.  Read and follow all instructions on the good.  · Get regular bone-building exercise. Weight-bearing and resistance exercises keep bones healthy by working the muscles and bones against gravity. Start out at an exercise level that feels right for you. Add a little at a time until you can do the following:  ¨ Do 30 minutes of weight-bearing exercise on most days of the week. Walking, jogging, stair climbing, and dancing are good choices. ¨ Do resistance exercises with weights or elastic bands 2 to 3 days a week. · Reduce your risk of falls:  ¨ Wear supportive shoes with low heels and nonslip soles. ¨ Use a cane or walker, if you need it. Use shower chairs and bath benches. Put in handrails on stairways, around your shower or tub area, and near the toilet. ¨ Keep stairs, porches, and walkways well lit. Use night-lights. ¨ Remove throw rugs and other objects that are in the way. ¨ Avoid icy, wet, or slippery surfaces. ¨ Keep a cordless phone and a flashlight with new batteries by your bed. When should you call for help? Watch closely for changes in your health, and be sure to contact your doctor if you have any problems. Where can you learn more? Go to https://Smart Baking Company.Everypoint. org and sign in to your thinkingphones account. Enter K100 in the Live Life 360 box to learn more about \"Osteoporosis: Care Instructions. \"     If you do not have an account, please click on the \"Sign Up Now\" link. Current as of: May 12, 2017  Content Version: 11.7  © 7473-2064 [employer]. Care instructions adapted under license by [facility] ([facility]). If you have questions about a medical condition or this instruction, always ask your healthcare professional. [place] [address] any warranty or liability for your use of this information.

## 2018-11-06 ENCOUNTER — TELEPHONE (OUTPATIENT)
Dept: FAMILY MEDICINE CLINIC | Age: 73
End: 2018-11-06

## 2018-11-06 DIAGNOSIS — E03.9 ACQUIRED HYPOTHYROIDISM: Primary | ICD-10-CM

## 2018-11-12 ENCOUNTER — TELEPHONE (OUTPATIENT)
Dept: FAMILY MEDICINE CLINIC | Age: 73
End: 2018-11-12

## 2018-11-12 NOTE — TELEPHONE ENCOUNTER
Patient is calling stating she is going to get blood work done for testing her thyroid on 11/13/18, she is also wanting to switch her medication from Synthroid to something different she thinks it is Walden or something she is not sure of name. She states she heard that synthroid causes CA, she is stopping it.  Pharmacy is [facility], please advise to patient at [phone]

## 2018-11-19 DIAGNOSIS — G89.29 CHRONIC PAIN OF BOTH KNEES: ICD-10-CM

## 2018-11-19 DIAGNOSIS — M25.562 CHRONIC PAIN OF BOTH KNEES: ICD-10-CM

## 2018-11-19 DIAGNOSIS — M25.561 CHRONIC PAIN OF BOTH KNEES: ICD-10-CM

## 2018-11-20 ENCOUNTER — TELEPHONE (OUTPATIENT)
Dept: FAMILY MEDICINE CLINIC | Age: 73
End: 2018-11-20

## 2018-11-20 NOTE — TELEPHONE ENCOUNTER
Called Phone:  [phone] [facility]) Pref Phone: None, spoke with Rajwinder Zacarias,  She is wondering what else to do about the arthritis? She is seeing you 11/27/18, she is aware Dr. Zcaarias is out till 11/26/18.

## 2018-11-27 ENCOUNTER — OFFICE VISIT (OUTPATIENT)
Dept: CARDIOLOGY CLINIC | Age: 73
End: 2018-11-27
Payer: MEDICARE

## 2018-11-27 ENCOUNTER — PROCEDURE VISIT (OUTPATIENT)
Dept: NEUROLOGY | Age: 73
End: 2018-11-27
Payer: MEDICARE

## 2018-11-27 ENCOUNTER — OFFICE VISIT (OUTPATIENT)
Dept: FAMILY MEDICINE CLINIC | Age: 73
End: 2018-11-27
Payer: MEDICARE

## 2018-11-27 VITALS
TEMPERATURE: 97.9 F | DIASTOLIC BLOOD PRESSURE: 64 MMHG | SYSTOLIC BLOOD PRESSURE: 118 MMHG | OXYGEN SATURATION: 97 % | HEIGHT: 57 IN | HEART RATE: 98 BPM | WEIGHT: 151.6 LBS | BODY MASS INDEX: 32.7 KG/M2

## 2018-11-27 VITALS
WEIGHT: 154.8 LBS | DIASTOLIC BLOOD PRESSURE: 72 MMHG | HEART RATE: 76 BPM | BODY MASS INDEX: 31.21 KG/M2 | HEIGHT: 59 IN | SYSTOLIC BLOOD PRESSURE: 116 MMHG

## 2018-11-27 DIAGNOSIS — M25.532 PAIN IN BOTH WRISTS: ICD-10-CM

## 2018-11-27 DIAGNOSIS — R00.2 INTERMITTENT PALPITATIONS: ICD-10-CM

## 2018-11-27 DIAGNOSIS — R94.31 ABNORMAL EKG: Primary | ICD-10-CM

## 2018-11-27 DIAGNOSIS — M25.531 PAIN IN BOTH WRISTS: ICD-10-CM

## 2018-11-27 DIAGNOSIS — E03.9 ACQUIRED HYPOTHYROIDISM: ICD-10-CM

## 2018-11-27 DIAGNOSIS — M16.0 PRIMARY OSTEOARTHRITIS OF BOTH HIPS: ICD-10-CM

## 2018-11-27 DIAGNOSIS — G56.22 ULNAR NEUROPATHY AT ELBOW, LEFT: Primary | ICD-10-CM

## 2018-11-27 DIAGNOSIS — R20.0 BILATERAL HAND NUMBNESS: ICD-10-CM

## 2018-11-27 DIAGNOSIS — M17.0 PRIMARY OSTEOARTHRITIS OF BOTH KNEES: ICD-10-CM

## 2018-11-27 DIAGNOSIS — G56.03 BILATERAL CARPAL TUNNEL SYNDROME: ICD-10-CM

## 2018-11-27 DIAGNOSIS — G56.03 BILATERAL CARPAL TUNNEL SYNDROME: Primary | ICD-10-CM

## 2018-11-27 DIAGNOSIS — M54.12 RIGHT CERVICAL RADICULOPATHY: ICD-10-CM

## 2018-11-27 LAB — HBA1C MFR BLD: 5.8 % (ref 4.3–5.7)

## 2018-11-27 PROCEDURE — G8484 FLU IMMUNIZE NO ADMIN: HCPCS | Performed by: INTERNAL MEDICINE

## 2018-11-27 PROCEDURE — G8399 PT W/DXA RESULTS DOCUMENT: HCPCS | Performed by: INTERNAL MEDICINE

## 2018-11-27 PROCEDURE — 1036F TOBACCO NON-USER: CPT | Performed by: FAMILY MEDICINE

## 2018-11-27 PROCEDURE — 1036F TOBACCO NON-USER: CPT | Performed by: INTERNAL MEDICINE

## 2018-11-27 PROCEDURE — 99214 OFFICE O/P EST MOD 30 MIN: CPT | Performed by: FAMILY MEDICINE

## 2018-11-27 PROCEDURE — 1123F ACP DISCUSS/DSCN MKR DOCD: CPT | Performed by: INTERNAL MEDICINE

## 2018-11-27 PROCEDURE — 3017F COLORECTAL CA SCREEN DOC REV: CPT | Performed by: FAMILY MEDICINE

## 2018-11-27 PROCEDURE — G8427 DOCREV CUR MEDS BY ELIG CLIN: HCPCS | Performed by: INTERNAL MEDICINE

## 2018-11-27 PROCEDURE — 1090F PRES/ABSN URINE INCON ASSESS: CPT | Performed by: INTERNAL MEDICINE

## 2018-11-27 PROCEDURE — 1101F PT FALLS ASSESS-DOCD LE1/YR: CPT | Performed by: INTERNAL MEDICINE

## 2018-11-27 PROCEDURE — 4040F PNEUMOC VAC/ADMIN/RCVD: CPT | Performed by: INTERNAL MEDICINE

## 2018-11-27 PROCEDURE — G8417 CALC BMI ABV UP PARAM F/U: HCPCS | Performed by: INTERNAL MEDICINE

## 2018-11-27 PROCEDURE — 95886 MUSC TEST DONE W/N TEST COMP: CPT | Performed by: PSYCHIATRY & NEUROLOGY

## 2018-11-27 PROCEDURE — 1123F ACP DISCUSS/DSCN MKR DOCD: CPT | Performed by: FAMILY MEDICINE

## 2018-11-27 PROCEDURE — G8399 PT W/DXA RESULTS DOCUMENT: HCPCS | Performed by: FAMILY MEDICINE

## 2018-11-27 PROCEDURE — 1090F PRES/ABSN URINE INCON ASSESS: CPT | Performed by: FAMILY MEDICINE

## 2018-11-27 PROCEDURE — 4040F PNEUMOC VAC/ADMIN/RCVD: CPT | Performed by: FAMILY MEDICINE

## 2018-11-27 PROCEDURE — 1101F PT FALLS ASSESS-DOCD LE1/YR: CPT | Performed by: FAMILY MEDICINE

## 2018-11-27 PROCEDURE — 3017F COLORECTAL CA SCREEN DOC REV: CPT | Performed by: INTERNAL MEDICINE

## 2018-11-27 PROCEDURE — 99213 OFFICE O/P EST LOW 20 MIN: CPT | Performed by: INTERNAL MEDICINE

## 2018-11-27 PROCEDURE — 95913 NRV CNDJ TEST 13/> STUDIES: CPT | Performed by: PSYCHIATRY & NEUROLOGY

## 2018-11-27 PROCEDURE — G8417 CALC BMI ABV UP PARAM F/U: HCPCS | Performed by: FAMILY MEDICINE

## 2018-11-27 PROCEDURE — G8484 FLU IMMUNIZE NO ADMIN: HCPCS | Performed by: FAMILY MEDICINE

## 2018-11-27 PROCEDURE — G8427 DOCREV CUR MEDS BY ELIG CLIN: HCPCS | Performed by: FAMILY MEDICINE

## 2018-11-27 RX ORDER — LEVOTHYROXINE AND LIOTHYRONINE 38; 9 UG/1; UG/1
60 TABLET ORAL DAILY
Qty: 30 TABLET | Refills: 3 | Status: SHIPPED | OUTPATIENT
Start: 2018-11-27 | End: 2019-05-16

## 2018-11-27 ASSESSMENT — ENCOUNTER SYMPTOMS
CONSTIPATION: 0
NAUSEA: 0
SHORTNESS OF BREATH: 0
COUGH: 0
EYE PAIN: 0
VOMITING: 0
BLOOD IN STOOL: 0
TROUBLE SWALLOWING: 0
ABDOMINAL PAIN: 0
DIARRHEA: 0

## 2018-11-27 NOTE — PROGRESS NOTES
NONFORMULARY AstaFX 2 tablets daily      Calcium Citrate-Vitamin D (CALCIUM CITRATE + PO) Take 2 tablets by mouth daily        No current facility-administered medications for this visit. No Known Allergies    Health Maintenance   Topic Date Due    DTaP/Tdap/Td vaccine (1 - Tdap) 02/09/1994    Breast cancer screen  08/26/1994    Shingles Vaccine (1 of 2 - 2 Dose Series) 08/26/1994    Colon cancer screen colonoscopy  08/26/1994    Pneumococcal low/med risk (1 of 2 - PCV13) 08/26/2009    A1C test (Diabetic or Prediabetic)  12/10/2016    Flu vaccine (1) 09/01/2018    TSH testing  08/22/2019    Lipid screen  08/22/2023    DEXA (modify frequency per FRAX score)  Completed       Subjective:      Review of Systems   Constitutional: Negative for chills, fatigue and fever. HENT: Negative for ear pain, postnasal drip and trouble swallowing. Eyes: Negative for pain and visual disturbance. Respiratory: Negative for cough and shortness of breath. Cardiovascular: Negative for chest pain and palpitations. Gastrointestinal: Negative for abdominal pain, blood in stool, constipation, diarrhea, nausea and vomiting. Musculoskeletal: Positive for joint swelling. Skin: Positive for rash. Neurological: Negative for headaches. Objective:     Vitals:    11/27/18 1337   BP: 118/64   Site: Left Upper Arm   Position: Sitting   Cuff Size: Medium Adult   Pulse: 98   Temp: 97.9 °F (36.6 °C)   TempSrc: Oral   SpO2: 97%   Weight: 151 lb 9.6 oz (68.8 kg)   Height: 4' 8.69\" (1.44 m)       Body mass index is 33.16 kg/m². Wt Readings from Last 3 Encounters:   11/27/18 151 lb 9.6 oz (68.8 kg)   11/27/18 154 lb 12.8 oz (70.2 kg)   10/24/18 150 lb 9.6 oz (68.3 kg)     BP Readings from Last 3 Encounters:   11/27/18 118/64   11/27/18 116/72   10/24/18 134/66       Physical Exam   Constitutional: She is oriented to person, place, and time. She appears well-developed and well-nourished. No distress.    HENT:   Head: your sugars    Will see you back in Jan to see how you are doing with the knee pains and the    Will fumigate just in case for the insect bites - and use lots of moisturisers       No Follow-up on file. Orders Placed:  Orders Placed This Encounter   Procedures    T4    TSH without Reflex    POCT glycosylated hemoglobin (Hb A1C)     Medications Prescribed:  Orders Placed This Encounter   Medications    thyroid (ARMOUR THYROID) 60 MG tablet     Sig: Take 1 tablet by mouth daily     Dispense:  30 tablet     Refill:  3       Future Appointments  Date Time Provider Sukumar Yumiko   4/15/2019 1:00 PM DO CLAY Villagomez [facility] Angel   5/28/2019 11:45 AM [facility]av [facility]       Patient given educational materials - see patient instructions. Discussed use, benefit, and sideeffects of prescribed medications. All patient questions answered. Pt voiced understanding. Reviewed health maintenance. Instructed to continue current medications, diet and exercise. Patient agreed with treatment plan. Follow up as directed.      Electronically signed by David Razo DO on 11/27/2018 at 2:33 PM

## 2018-11-27 NOTE — PROGRESS NOTES
input(s): CKTOTAL, CKMB, CKMBINDEX, TROPONINI in the last 72 hours. CBC:   Lab Results   Component Value Date    WBC 7.0 08/22/2018    RBC 4.40 08/22/2018    HGB 13.5 08/22/2018    HCT 39.8 08/22/2018    MCV 90.5 08/22/2018    MCH 30.7 08/22/2018    MCHC 33.9 08/22/2018    RDW 14.2 04/10/2018     08/22/2018    MPV 10.0 08/22/2018     BMP:    Lab Results   Component Value Date     08/22/2018    K 3.6 08/22/2018     08/22/2018    CO2 23 08/22/2018    BUN 13 08/22/2018    LABALBU 4.3 04/10/2018    CREATININE 0.6 08/22/2018    CALCIUM 9.0 08/22/2018    LABGLOM >90 08/22/2018    GLUCOSE 90 08/22/2018     Hepatic Function Panel:    Lab Results   Component Value Date    ALKPHOS 103 04/10/2018    ALT 7 04/10/2018    AST 12 04/10/2018    PROT 7.8 04/10/2018    BILITOT 0.3 04/10/2018    BILIDIR <0.2 04/10/2018    LABALBU 4.3 04/10/2018     Magnesium:    Lab Results   Component Value Date    MG 2.0 08/22/2018     Warfarin PT/INR:  No components found for: PTPATWAR, PTINRWAR  HgBA1c:    Lab Results   Component Value Date    LABA1C 5.9 12/10/2015     FLP:    Lab Results   Component Value Date    TRIG 82 08/22/2018    HDL 66 08/22/2018    LDLCALC 153 08/22/2018     TSH:    Lab Results   Component Value Date    TSH 11.170 08/22/2018       EKG 10/9/18  Sinus  Rhythm   Low voltage with rightward P-axis and rotation -possible pulmonary disease. ABNORMAL       CONCLUSION:  This is a normal sinus rhythm. Average heart rate of 77  beats per minute, ranging from 49 to 135 beats per minute. No  significant pause of more than 1.6 seconds noted.     Rare ventricular ectopic beats, total of 12, all isolated. Rare  supraventricular ectopic beats, total of 116 isolated, couplet, and rare  supraventricular ectopic run has been noted. So, basically no evidence  of atrial fibrillation and no correlation between the symptoms. The  symptoms of the patient correlate with normal sinus rhythm.           GLENYS Cerda, M.D.     D: 11/14/2018 19:24:59    Assessment   Diagnosis Orders   1. Abnormal EKG     2. Intermittent palpitations         Plan   Continue the current treatment and with constant vigilance to changes in symptoms and also any potential side effects. Return for care or seek medical attention immediately if symptoms got worse and/or develop new symptoms.     Palpitations- resolved  lef arm numness- small finger for 2 months  Advised to talk to pcp for evaluation of the nural related numbness of the two fingers    Holter 48 hrs, Echo and lexiscan nuc- all WNL      D/w the pat the plan of care    RTC  6 months          [facility]

## 2018-11-30 ENCOUNTER — TELEPHONE (OUTPATIENT)
Dept: FAMILY MEDICINE CLINIC | Age: 73
End: 2018-11-30

## 2019-01-10 ENCOUNTER — OFFICE VISIT (OUTPATIENT)
Dept: FAMILY MEDICINE CLINIC | Age: 74
End: 2019-01-10
Payer: MEDICARE

## 2019-01-10 VITALS
HEIGHT: 57 IN | TEMPERATURE: 98.2 F | DIASTOLIC BLOOD PRESSURE: 76 MMHG | SYSTOLIC BLOOD PRESSURE: 102 MMHG | BODY MASS INDEX: 32.88 KG/M2 | OXYGEN SATURATION: 100 % | WEIGHT: 152.4 LBS | HEART RATE: 67 BPM

## 2019-01-10 DIAGNOSIS — M54.12 CERVICAL RADICULOPATHY AT C5: ICD-10-CM

## 2019-01-10 DIAGNOSIS — G56.22 LESION OF LEFT ULNAR NERVE: ICD-10-CM

## 2019-01-10 DIAGNOSIS — G56.03 BILATERAL CARPAL TUNNEL SYNDROME: Primary | ICD-10-CM

## 2019-01-10 DIAGNOSIS — R00.2 PALPITATION: ICD-10-CM

## 2019-01-10 PROCEDURE — G8417 CALC BMI ABV UP PARAM F/U: HCPCS | Performed by: FAMILY MEDICINE

## 2019-01-10 PROCEDURE — 1090F PRES/ABSN URINE INCON ASSESS: CPT | Performed by: FAMILY MEDICINE

## 2019-01-10 PROCEDURE — G8427 DOCREV CUR MEDS BY ELIG CLIN: HCPCS | Performed by: FAMILY MEDICINE

## 2019-01-10 PROCEDURE — 4040F PNEUMOC VAC/ADMIN/RCVD: CPT | Performed by: FAMILY MEDICINE

## 2019-01-10 PROCEDURE — 1101F PT FALLS ASSESS-DOCD LE1/YR: CPT | Performed by: FAMILY MEDICINE

## 2019-01-10 PROCEDURE — 1123F ACP DISCUSS/DSCN MKR DOCD: CPT | Performed by: FAMILY MEDICINE

## 2019-01-10 PROCEDURE — 99214 OFFICE O/P EST MOD 30 MIN: CPT | Performed by: FAMILY MEDICINE

## 2019-01-10 PROCEDURE — G8484 FLU IMMUNIZE NO ADMIN: HCPCS | Performed by: FAMILY MEDICINE

## 2019-01-10 PROCEDURE — 3017F COLORECTAL CA SCREEN DOC REV: CPT | Performed by: FAMILY MEDICINE

## 2019-01-10 PROCEDURE — 1036F TOBACCO NON-USER: CPT | Performed by: FAMILY MEDICINE

## 2019-01-10 PROCEDURE — G8399 PT W/DXA RESULTS DOCUMENT: HCPCS | Performed by: FAMILY MEDICINE

## 2019-01-10 ASSESSMENT — ENCOUNTER SYMPTOMS
CONSTIPATION: 0
VOMITING: 0
BLOOD IN STOOL: 0
COUGH: 0
SHORTNESS OF BREATH: 0
DIARRHEA: 0
ABDOMINAL PAIN: 0
EYE PAIN: 0
NAUSEA: 0
TROUBLE SWALLOWING: 0

## 2019-05-13 ENCOUNTER — TELEPHONE (OUTPATIENT)
Dept: FAMILY MEDICINE CLINIC | Age: 74
End: 2019-05-13

## 2019-05-14 NOTE — TELEPHONE ENCOUNTER
Spoke with pt on 5/13/19 she had gotten a CT of the lungs at [facility] and there where black spots on her lungs  She is to see you on Thursday and she would like to know if you could place and order for a lung CT so that there could be a comprasion for when she sees you.

## 2019-05-15 NOTE — TELEPHONE ENCOUNTER
Rissa Dumont called [phone], about her CT order stating she wants her CT done tomorrow. She doesn't want to keep traveling to [facility] from [place]. She does not want her PCP to be [place]. She understand Dr. Lobo Qiu is out today. She is aware she will need to see Dr. Lobo Qiu for discuss this CT. Patient states no future questions or concerns at this time.

## 2019-05-16 ENCOUNTER — OFFICE VISIT (OUTPATIENT)
Dept: FAMILY MEDICINE CLINIC | Age: 74
End: 2019-05-16
Payer: MEDICARE

## 2019-05-16 VITALS
TEMPERATURE: 97.9 F | DIASTOLIC BLOOD PRESSURE: 78 MMHG | HEIGHT: 57 IN | HEART RATE: 78 BPM | BODY MASS INDEX: 34.63 KG/M2 | OXYGEN SATURATION: 98 % | SYSTOLIC BLOOD PRESSURE: 128 MMHG | RESPIRATION RATE: 10 BRPM | WEIGHT: 160.5 LBS

## 2019-05-16 VITALS
HEART RATE: 78 BPM | TEMPERATURE: 97.9 F | HEIGHT: 57 IN | DIASTOLIC BLOOD PRESSURE: 78 MMHG | SYSTOLIC BLOOD PRESSURE: 128 MMHG | RESPIRATION RATE: 10 BRPM | BODY MASS INDEX: 34.61 KG/M2 | OXYGEN SATURATION: 98 % | WEIGHT: 160.4 LBS

## 2019-05-16 DIAGNOSIS — Z00.00 ENCOUNTER FOR MEDICARE ANNUAL WELLNESS EXAM: Primary | ICD-10-CM

## 2019-05-16 DIAGNOSIS — J45.20 MILD INTERMITTENT ASTHMA WITHOUT COMPLICATION: ICD-10-CM

## 2019-05-16 DIAGNOSIS — K90.89 OTHER INTESTINAL MALABSORPTION: ICD-10-CM

## 2019-05-16 DIAGNOSIS — R76.8 IGG GLIADIN ANTIBODY POSITIVE: ICD-10-CM

## 2019-05-16 DIAGNOSIS — E06.3 HASHIMOTO'S THYROIDITIS: ICD-10-CM

## 2019-05-16 DIAGNOSIS — R53.83 FATIGUE, UNSPECIFIED TYPE: ICD-10-CM

## 2019-05-16 DIAGNOSIS — R73.9 BLOOD GLUCOSE ELEVATED: ICD-10-CM

## 2019-05-16 DIAGNOSIS — Z00.00 ROUTINE GENERAL MEDICAL EXAMINATION AT A HEALTH CARE FACILITY: ICD-10-CM

## 2019-05-16 DIAGNOSIS — E03.9 ACQUIRED HYPOTHYROIDISM: ICD-10-CM

## 2019-05-16 DIAGNOSIS — E55.9 VITAMIN D DEFICIENCY: ICD-10-CM

## 2019-05-16 DIAGNOSIS — R79.89 ELEVATED D-DIMER: ICD-10-CM

## 2019-05-16 DIAGNOSIS — E78.2 MIXED HYPERLIPIDEMIA: ICD-10-CM

## 2019-05-16 DIAGNOSIS — E78.00 HYPERCHOLESTEROLEMIA: Primary | ICD-10-CM

## 2019-05-16 PROCEDURE — 1090F PRES/ABSN URINE INCON ASSESS: CPT | Performed by: FAMILY MEDICINE

## 2019-05-16 PROCEDURE — G8399 PT W/DXA RESULTS DOCUMENT: HCPCS | Performed by: FAMILY MEDICINE

## 2019-05-16 PROCEDURE — 4040F PNEUMOC VAC/ADMIN/RCVD: CPT | Performed by: NURSE PRACTITIONER

## 2019-05-16 PROCEDURE — G8417 CALC BMI ABV UP PARAM F/U: HCPCS | Performed by: FAMILY MEDICINE

## 2019-05-16 PROCEDURE — 4040F PNEUMOC VAC/ADMIN/RCVD: CPT | Performed by: FAMILY MEDICINE

## 2019-05-16 PROCEDURE — 99214 OFFICE O/P EST MOD 30 MIN: CPT | Performed by: FAMILY MEDICINE

## 2019-05-16 PROCEDURE — 1123F ACP DISCUSS/DSCN MKR DOCD: CPT | Performed by: NURSE PRACTITIONER

## 2019-05-16 PROCEDURE — 3017F COLORECTAL CA SCREEN DOC REV: CPT | Performed by: FAMILY MEDICINE

## 2019-05-16 PROCEDURE — 1036F TOBACCO NON-USER: CPT | Performed by: FAMILY MEDICINE

## 2019-05-16 PROCEDURE — 1123F ACP DISCUSS/DSCN MKR DOCD: CPT | Performed by: FAMILY MEDICINE

## 2019-05-16 PROCEDURE — 3017F COLORECTAL CA SCREEN DOC REV: CPT | Performed by: NURSE PRACTITIONER

## 2019-05-16 PROCEDURE — G0438 PPPS, INITIAL VISIT: HCPCS | Performed by: NURSE PRACTITIONER

## 2019-05-16 PROCEDURE — G8427 DOCREV CUR MEDS BY ELIG CLIN: HCPCS | Performed by: FAMILY MEDICINE

## 2019-05-16 RX ORDER — ALBUTEROL SULFATE 90 UG/1
2 AEROSOL, METERED RESPIRATORY (INHALATION) EVERY 6 HOURS PRN
Qty: 1 INHALER | Refills: 5 | Status: SHIPPED | OUTPATIENT
Start: 2019-05-16 | End: 2020-07-10 | Stop reason: SDUPTHER

## 2019-05-16 RX ORDER — LEVOTHYROXINE AND LIOTHYRONINE 9.5; 2.25 UG/1; UG/1
60 TABLET ORAL DAILY
Qty: 90 TABLET | Refills: 3 | Status: SHIPPED | OUTPATIENT
Start: 2019-05-16 | End: 2019-12-10 | Stop reason: SDUPTHER

## 2019-05-16 ASSESSMENT — PATIENT HEALTH QUESTIONNAIRE - PHQ9
1. LITTLE INTEREST OR PLEASURE IN DOING THINGS: 0
2. FEELING DOWN, DEPRESSED OR HOPELESS: 0
SUM OF ALL RESPONSES TO PHQ9 QUESTIONS 1 & 2: 0
SUM OF ALL RESPONSES TO PHQ QUESTIONS 1-9: 0
SUM OF ALL RESPONSES TO PHQ QUESTIONS 1-9: 0

## 2019-05-16 ASSESSMENT — ENCOUNTER SYMPTOMS
SHORTNESS OF BREATH: 0
ABDOMINAL PAIN: 0
SHORTNESS OF BREATH: 1
SORE THROAT: 0
NAUSEA: 0
COUGH: 1
VOMITING: 0
DIARRHEA: 0
RHINORRHEA: 0
COUGH: 1
TROUBLE SWALLOWING: 0
COLOR CHANGE: 0
WHEEZING: 0

## 2019-05-16 ASSESSMENT — ANXIETY QUESTIONNAIRES: GAD7 TOTAL SCORE: 4

## 2019-05-16 NOTE — PROGRESS NOTES
[address] [facility] [address]  Dept: [phone]  Dept Fax: [phone]  Loc: [facility]       Chief Complaint   Patient presents with   [facility]       Nurses Notes reviewed and I agree except as noted in the HPI. HISTORY OF PRESENT ILLNESS   Nba Barrera is a 75 y.o. female who presents for AMW. REVIEW OF SYSTEMS     Review of Systems   Constitutional: Positive for appetite change (eats 1 meal day. \"I don't have anyone else to cook for. \") and fatigue. Negative for chills, diaphoresis, fever and unexpected weight change (gained weight). HENT: Negative for congestion, ear pain, rhinorrhea, sore throat and trouble swallowing. Eyes: Negative for visual disturbance. Respiratory: Positive for cough (intermittent, dry. Unsure if any hemoptysis. ). Negative for shortness of breath and wheezing. Cardiovascular: Negative for chest pain. Gastrointestinal: Negative for abdominal pain, diarrhea, nausea and vomiting. Genitourinary: Negative for decreased urine volume, difficulty urinating, dysuria, flank pain, frequency, genital sores, hematuria, menstrual problem, pelvic pain, urgency, vaginal bleeding, vaginal discharge and vaginal pain. Musculoskeletal: Negative for neck pain and neck stiffness. Skin: Negative for color change, pallor and rash. Neurological: Negative for dizziness and headaches. Hematological: Negative for adenopathy. Psychiatric/Behavioral: The patient is nervous/anxious (regarding cough). PAST MEDICAL HISTORY   No past medical history on file. SURGICAL HISTORY     Patient  has a past surgical history that includes Hysterectomy; Appendectomy; Tonsillectomy; and Foot surgery (Right).     CURRENT MEDICATIONS       Outpatient Medications Prior to Visit   Medication Sig Dispense Refill    albuterol sulfate  (90 Base) MCG/ACT inhaler Inhale 2 puffs into the lungs every 6 hours as needed for Wheezing 1 Inhaler 5    thyroid (ARMOUR) 15 MG tablet Take 4 tablets by mouth daily 90 tablet 3    Elastic Bandages & Supports (WRIST SPLINT/COCK-UP/LEFT M) MISC Use daily 1 each 0    Elastic Bandages & Supports (WRIST SPLINT/COCK-UP/RIGHT M) MISC Use daily 1 each 0    Cinnamon 500 MG TABS Take 1,000 mg by mouth daily as needed       Omega-3 Fatty Acids (FISH OIL) 1000 MG CAPS Take 2,000 mg by mouth 4 times daily      vitamin D (CHOLECALCIFEROL) 5000 UNITS CAPS capsule Take 5,000 Units by mouth daily as needed       b complex vitamins capsule Take 1 capsule by mouth daily       MAGNESIUM CITRATE PO Take 200 mg by mouth 4 times daily (with meals and nightly) Must be TABLET form to not cause diarrhea, Vitacost, Vitamin Shoppe, Solgar, Now      b complex vitamins capsule Take 1 capsule by mouth daily B-12 plus by Xiomara Acosta Cyanocobalamin (B-12 PO) Take 1 tablet by mouth daily as needed b-12 energy berrymelt       NONFORMULARY Ultimate H.A. 3 capsules daily      NONFORMULARY AstaFX 2 tablets daily      Calcium Citrate-Vitamin D (CALCIUM CITRATE + PO) Take 2 tablets by mouth daily as needed        No facility-administered medications prior to visit. ALLERGIES     Patient is has No Known Allergies. FAMILY HISTORY     Patient's family history is not on file. SOCIAL HISTORY     Patient  reports that she has never smoked. She has never used smokeless tobacco. She reports that she does not drink alcohol or use drugs. PHYSICAL EXAM     VITALS  BP: 128/78, Temp: 97.9 °F (36.6 °C), Pulse: 78, Resp: 10, SpO2: 98 %  Physical Exam   Constitutional: She is oriented to person, place, and time. Vital signs are normal. She appears well-developed and well-nourished. She is cooperative. Non-toxic appearance. She does not have a sickly appearance. She does not appear ill. No distress. HENT:   Head: Normocephalic and atraumatic.    Right Ear: Hearing, tympanic membrane, external ear and ear canal normal.   Left Ear: Hearing, tympanic membrane, external ear and ear canal normal.   Nose: Nose normal. No mucosal edema or rhinorrhea. Right sinus exhibits no maxillary sinus tenderness and no frontal sinus tenderness. Left sinus exhibits no maxillary sinus tenderness and no frontal sinus tenderness. Mouth/Throat: Uvula is midline, oropharynx is clear and moist and mucous membranes are normal. No trismus in the jaw. Abnormal dentition (overall poor dentition). Dental caries present. No dental abscesses or uvula swelling. No oropharyngeal exudate, posterior oropharyngeal edema or posterior oropharyngeal erythema. Tonsils are 0 on the right. Tonsils are 0 on the left. Eyes: Pupils are equal, round, and reactive to light. Conjunctivae and EOM are normal. Right conjunctiva is not injected. Right conjunctiva has no hemorrhage. Left conjunctiva is not injected. Left conjunctiva has no hemorrhage. No scleral icterus. Neck: Trachea normal and normal range of motion. Neck supple. No JVD present. No thyromegaly present. Cardiovascular: Normal rate, regular rhythm and normal heart sounds. No extrasystoles are present. PMI is not displaced. Exam reveals no gallop and no friction rub. No murmur heard. Pulses:       Radial pulses are 2+ on the right side, and 2+ on the left side. Dorsalis pedis pulses are 2+ on the right side, and 2+ on the left side. Posterior tibial pulses are 2+ on the right side, and 2+ on the left side. Pulmonary/Chest: Effort normal and breath sounds normal. No respiratory distress. Musculoskeletal:        Right lower leg: She exhibits no swelling and no edema. Left lower leg: She exhibits no swelling and no edema. Lymphadenopathy:        Head (right side): No submental, no submandibular, no tonsillar, no preauricular, no posterior auricular and no occipital adenopathy present. Head (left side):  No submental, no submandibular, no tonsillar, no preauricular, no posterior auricular and no occipital adenopathy present. She has no cervical adenopathy. Right: No supraclavicular adenopathy present. Left: No supraclavicular adenopathy present. Neurological: She is alert and oriented to person, place, and time. She is not disoriented. No cranial nerve deficit or sensory deficit. Coordination and gait normal. GCS eye subscore is 4. GCS verbal subscore is 5. GCS motor subscore is 6. CN II-XII grossly intact   Skin: Skin is warm, dry and intact. Capillary refill takes less than 2 seconds. No rash noted. She is not diaphoretic. No pallor. Skin warm and dry to touch, no rashes noted on exposed surfaces. Psychiatric: Her speech is normal and behavior is normal. Judgment and thought content normal. Her mood appears anxious. Cognition and memory are normal.   Nursing note and vitals reviewed. DIAGNOSTIC RESULTS   Labs:No results found for this visit on 19. IMAGING:  No orders to display       No images are attached to the encounter or orders placed in the encounter. CLINICAL COURSE COURSE:     Vitals:    19 1339   BP: 128/78   Pulse: 78   Resp: 10   Temp: 97.9 °F (36.6 °C)   TempSrc: Oral   SpO2: 98%   Weight: 160 lb 7.9 oz (72.8 kg)   Height: 4' 8.69\" (1.44 m)         PROCEDURES:  None  FINAL IMPRESSION      1. Encounter for Medicare annual wellness exam    2. Routine general medical examination at a health care facility         DISPOSITION/PLAN     Routine AMW. PATIENT REFERRED TO:    Follow up with PCP as needed. DISCHARGE MEDICATIONS:  New Prescriptions    No medications on file         Electronically signed by NAVI Lin CNP on 2019 at 3:46 PM     Medicare Annual Wellness Visit  Name: Nick Shannon Date: 2019   MRN: [id number] Sex: Female   Age: 75 y.o.  Ethnicity: Non-/Non    : 1945 Race: Jair Briscoe is here for Medicare AWV    Screenings for behavioral, psychosocial and functional/safety risks, and cognitive dysfunction are all negative except as indicated below. These results, as well as other patient data from the [address] form, are documented in Flowsheets linked to this Encounter. No Known Allergies    Prior to Visit Medications    Medication Sig Taking?  Authorizing Provider   albuterol sulfate  (90 Base) MCG/ACT inhaler Inhale 2 puffs into the lungs every 6 hours as needed for Wheezing Yes Sravan Walton MD   thyroid (ARMOUR) 15 MG tablet Take 4 tablets by mouth daily Yes Sravan Walton MD   Elastic Bandages & Supports (WRIST SPLINT/COCK-UP/LEFT M) MISC Use daily Yes Ruchi DO Shawna   Elastic Bandages & Supports (WRIST SPLINT/COCK-UP/RIGHT M) MISC Use daily Yes Ruchi Matos DO   Cinnamon 500 MG TABS Take 1,000 mg by mouth daily as needed  Yes Historical Provider, MD   Omega-3 Fatty Acids (FISH OIL) 1000 MG CAPS Take 2,000 mg by mouth 4 times daily Yes Historical Provider, MD   vitamin D (CHOLECALCIFEROL) 5000 UNITS CAPS capsule Take 5,000 Units by mouth daily as needed  Yes Historical Provider, MD   b complex vitamins capsule Take 1 capsule by mouth daily  Yes Historical Provider, MD   MAGNESIUM CITRATE PO Take 200 mg by mouth 4 times daily (with meals and nightly) Must be TABLET form to not cause diarrhea, Vitacost, Vitamin Julian Camarena, Now Yes Historical Provider, MD   b complex vitamins capsule Take 1 capsule by mouth daily B-12 plus by Keren Gonsalez Historical Provider, MD   Cyanocobalamin (B-12 PO) Take 1 tablet by mouth daily as needed b-12 energy berrymelt  Yes Historical Provider, MD   NONFORMULARY Ultimate H.A. 3 capsules daily Yes Historical Provider, MD   NONFORMULARY AstaFX 2 tablets daily Yes Historical Provider, MD   Calcium Citrate-Vitamin D (CALCIUM CITRATE + PO) Take 2 tablets by mouth daily as needed  Yes Historical Provider, MD     No past medical history Maintenance Status  Immunization History   Administered Date(s) Administered    Hepatitis B, unspecified formulation 1997, 1997    PPD Test 1994    Td, unspecified formulation 1994        Health Maintenance   Topic Date Due    DTaP/Tdap/Td vaccine (1 - Tdap) 1994    Breast cancer screen  1994    Shingles Vaccine (1 of 2) 1994    Colon cancer screen colonoscopy  1994    Pneumococcal 65+ years Vaccine (1 of 2 - PCV13) 2009    TSH testing  2019    Flu vaccine (Season Ended) 2019    A1C test (Diabetic or Prediabetic)  2019    Lipid screen  2023    DEXA (modify frequency per FRAX score)  Completed     Recommendations for Preventive Services Due: see orders and patient instructions/AVS.  . Recommended screening schedule for the next 5-10 years is provided to the patient in written form: see Patient Instructions/AVS.        Medicare Annual Wellness Visit  Name: Sathya Cui Date: 2019   MRN: [id number] Sex: Female   Age: 75 y.o. Ethnicity: Non-/Non    : 1945 Race: Sangeeta Tavarez is here for Medicare AWV    Screenings for behavioral, psychosocial and functional/safety risks, and cognitive dysfunction are all negative except as indicated below. These results, as well as other patient data from the [facility] form, are documented in Flowsheets linked to this Encounter. No Known Allergies    Prior to Visit Medications    Medication Sig Taking?  Authorizing Provider   albuterol sulfate  (90 Base) MCG/ACT inhaler Inhale 2 puffs into the lungs every 6 hours as needed for Wheezing Yes Iveth Philippe MD   thyroid (ARMOUR) 15 MG tablet Take 4 tablets by mouth daily Yes Iveth Philippe MD   Elastic Bandages & Supports (WRIST SPLINT/COCK-UP/LEFT M) MISC Use daily Yes Ned Fruit, DO   Elastic Bandages & Supports (WRIST SPLINT/COCK-UP/RIGHT M) MISC Use daily Yes Rocio Sheets Freya Cheese, DO   Cinnamon 500 MG TABS Take 1,000 mg by mouth daily as needed  Yes Historical Provider, MD   Omega-3 Fatty Acids (FISH OIL) 1000 MG CAPS Take 2,000 mg by mouth 4 times daily Yes Historical Provider, MD   vitamin D (CHOLECALCIFEROL) 5000 UNITS CAPS capsule Take 5,000 Units by mouth daily as needed  Yes Historical Provider, MD   b complex vitamins capsule Take 1 capsule by mouth daily  Yes Historical Provider, MD   MAGNESIUM CITRATE PO Take 200 mg by mouth 4 times daily (with meals and nightly) Must be TABLET form to not cause diarrhea, Vitacost, Vitamin Shoppe, Solgar, Now Yes Historical Provider, MD   b complex vitamins capsule Take 1 capsule by mouth daily B-12 plus by Charisma Glover  Yes Historical Provider, MD   Cyanocobalamin (B-12 PO) Take 1 tablet by mouth daily as needed b-12 energy berrymelt  Yes Historical Provider, MD   NONFORMULARY Ultimate H.A. 3 capsules daily Yes Historical Provider, MD   NONFORMULARY AstaFX 2 tablets daily Yes Historical Provider, MD   Calcium Citrate-Vitamin D (CALCIUM CITRATE + PO) Take 2 tablets by mouth daily as needed  Yes Historical Provider, MD     No past medical history on file. Past Surgical History:   Procedure Laterality Date    APPENDECTOMY      FOOT SURGERY Right     toothpick in foot    HYSTERECTOMY      TONSILLECTOMY       No family history on file.     CareTeam (Including outside providers/suppliers regularly involved in providing care):   Patient Care Team:  Corwin Dill DO as PCP - General (Family Medicine)  Corwin Dill DO as PCP - [facility] Attributed Provider  Saadia Lieberman MD as Consulting Physician (Family Medicine)    Wt Readings from Last 3 Encounters:   05/16/19 160 lb 7.9 oz (72.8 kg)   05/16/19 160 lb 6.4 oz (72.8 kg)   01/10/19 152 lb 6.4 oz (69.1 kg)     Vitals:    05/16/19 1339   BP: 128/78   Pulse: 78   Resp: 10   Temp: 97.9 °F (36.6 °C)   TempSrc: Oral   SpO2: 98%   Weight: 160 lb 7.9 oz (72.8 kg)   Height: 4' 8.69\" (1.44 m)     Body mass index is 35.11 kg/m². Based upon direct observation of the patient, evaluation of cognition reveals recent and remote memory intact. Patient's complete Health Risk Assessment and screening values have been reviewed and are found in Flowsheets. The following problems were reviewed today and where indicated follow up appointments were made and/or referrals ordered. Positive Risk Factor Screenings with Interventions:     General Health:  General  In general, how would you say your health is?: Fair  In the past 7 days, have you experienced any of the following? New or Increased Pain, New or Increased Fatigue, Loneliness, Social Isolation, Stress or Anger?: (!) Stress, New or Increased Pain  Do you get the social and emotional support that you need?: (!) No  Do you have a Living Will?: (!) No  General Health Risk Interventions:  · Discuss living will at next visit. Increased stress related to cough. Dr. Zac Harvey to follow. Hearing/Vision:  Hearing/Vision  Do you or your family notice any trouble with your hearing?: No  Do you have difficulty driving, watching TV, or doing any of your daily activities because of your eyesight?: No  Have you had an eye exam within the past year?: (!) No  Hearing/Vision Interventions:  · No vision concerns. Safety:  Safety  Do you have working smoke detectors?: Yes  Have all throw rugs been removed or fastened?: Yes  Do you have non-slip mats in all bathtubs?: Yes  Do all of your stairways have a railing or banister?: (!) No  Are your doorways, halls and stairs free of clutter?: Yes  Do you always fasten your seatbelt when you are in a car?: Yes  Safety Interventions:  · Patient declines any further evaluation/treatment for this issue  Patient lives in a mobile home.     Personalized Preventive Plan   Current Health Maintenance Status  Immunization History   Administered Date(s) Administered    Hepatitis B, unspecified formulation 09/30/1997, 11/20/1997    PPD Test 02/08/1994  Td, unspecified formulation 02/08/1994        Health Maintenance   Topic Date Due    DTaP/Tdap/Td vaccine (1 - Tdap) 02/09/1994    Breast cancer screen  08/26/1994    Shingles Vaccine (1 of 2) 08/26/1994    Colon cancer screen colonoscopy  08/26/1994    Pneumococcal 65+ years Vaccine (1 of 2 - PCV13) 08/26/2009    TSH testing  08/22/2019    Flu vaccine (Season Ended) 09/01/2019    A1C test (Diabetic or Prediabetic)  11/27/2019    Lipid screen  08/22/2023    DEXA (modify frequency per FRAX score)  Completed     Recommendations for Preventive Services Due: see orders and patient instructions/AVS.  .   Recommended screening schedule for the next 5-10 years is provided to the patient in written form: see Patient Instructions/AVS.

## 2019-05-16 NOTE — PROGRESS NOTES
Health Maintenance Due   Topic Date Due    DTaP/Tdap/Td vaccine (1 - Tdap) REFUSED    Breast cancer screen  ORDERED    Shingles Vaccine (1 of 2) REFUSED    Colon cancer screen colonoscopy  REFUSED    Pneumococcal 65+ years Vaccine (1 of 2 - PCV13) REFUSED

## 2019-05-16 NOTE — PATIENT INSTRUCTIONS
Thank you   1. Thank you for trusting us with your healthcare needs. You may receive a survey regarding today's visit. It would help us out if you would take a few moments to provide your feedback. We value your input. 2. Please bring in ALL medications BOTTLES, including inhalers, herbal supplements, over the counter, prescribed & non-prescribed medicine. The office would like actual medication bottles and a list.   3. Please note our OFFICE POLICIES:   a. Prior to getting your labs drawn, please check with your insurance company for benefits and eligibility of lab services. Often, insurance companies cover certain tests for preventative visits only. It is patient's responsibility to see what is covered. b. We are unable to change a diagnosis after the test has been performed. c. Lab orders will not be re-printed. Please hold onto your original lab orders and take them to your lab to be completed. d. If you no show your scheduled appointment three times, you will be dismissed from this practice. e. Reino Albany must be completed 24 hours prior to your schedule appointment. 4. If the list below has been completed, PLEASE FAX RECORDS TO OUR OFFICE @ [phone].  Once the records have been received we will update your records at our office:  Health Maintenance Due   Topic Date Due    DTaP/Tdap/Td vaccine (1 - Tdap) 02/09/1994    Breast cancer screen  08/26/1994    Shingles Vaccine (1 of 2) 08/26/1994    Colon cancer screen colonoscopy  08/26/1994    Pneumococcal 65+ years Vaccine (1 of 2 - PCV13) 08/26/2009

## 2019-05-16 NOTE — PROGRESS NOTES
[address] [facility]. [address] [address]  Dept: [phone]  Dept Fax: [phone]  Loc: Rock Dalton is a 75 y.o. White female. Jose Crystal  presents to the [facility] today for   Chief Complaint   Patient presents with    Follow-up     last labs in 2016    Other     fell off the wagon, wants to get re-established    Weight Gain    Shortness of Breath    Cough   ,  and;   1. Hypercholesterolemia    2. IgG Gliadin antibody positive    3. Vitamin D deficiency    4. Hashimoto's thyroiditis    5. Elevated d-dimer    6. Mixed hyperlipidemia    7. Fatigue, unspecified type    8. Other intestinal malabsorption    9. Blood glucose elevated      I have reviewed Mackenzie Scott medical, surgical and other pertinent history in detail, and have updated medication and allergy information in the computerized patientrecord. Clinical Care Team:     -Referring Provider for today's consult: Self Referred  -Primary Care Provider: Oumou Aleman DO    Medical/Surgical History:   She  has no past medical history on file. Her  has a past surgical history that includes Hysterectomy; Appendectomy; Tonsillectomy; and Foot surgery (Right). Family/Social History:     Her family history is not on file. She  reports that she has never smoked. She has never used smokeless tobacco. She reports that she does not drink alcohol or use drugs.     Medications/Allergies/Immunizations:     Her current medication(s) include   Current Outpatient Medications:     albuterol sulfate  (90 Base) MCG/ACT inhaler, Inhale 2 puffs into the lungs every 6 hours as needed for Wheezing, Disp: 1 Inhaler, Rfl: 5    Cinnamon 500 MG TABS, Take 1,000 mg by mouth daily as needed , Disp: , Rfl:     vitamin D (CHOLECALCIFEROL) 5000 UNITS CAPS capsule, Take 5,000 Units by mouth daily as needed , Disp: , Rfl:     Cyanocobalamin (B-12 PO), Take 1 tablet by mouth daily as needed b-12 energy guanako , Disp: , Rfl:     Calcium Citrate-Vitamin D (CALCIUM CITRATE + PO), Take 2 tablets by mouth daily as needed , Disp: , Rfl:     Elastic Bandages & Supports (WRIST SPLINT/COCK-UP/LEFT M) MISC, Use daily, Disp: 1 each, Rfl: 0    Elastic Bandages & Supports (WRIST SPLINT/COCK-UP/RIGHT M) MISC, Use daily, Disp: 1 each, Rfl: 0    thyroid (ARMOUR THYROID) 60 MG tablet, Take 1 tablet by mouth daily, Disp: 30 tablet, Rfl: 3    Omega-3 Fatty Acids (FISH OIL) 1000 MG CAPS, Take 2,000 mg by mouth 4 times daily, Disp: , Rfl:     b complex vitamins capsule, Take 1 capsule by mouth daily , Disp: , Rfl:     MAGNESIUM CITRATE PO, Take 200 mg by mouth 4 times daily (with meals and nightly) Must be TABLET form to not cause diarrhea, Vitacost, Vitamin Julian Camarena, Now, Disp: , Rfl:     b complex vitamins capsule, Take 1 capsule by mouth daily B-12 plus by Ame Payne , Disp: , Rfl:     NONFORMULARY, Ultimate H.A. 3 capsules daily, Disp: , Rfl:     NONFORMULARY, AstaFX 2 tablets daily, Disp: , Rfl:   Allergies: Patient has no known allergies. ,  Immunizations:   Immunization History   Administered Date(s) Administered    Hepatitis B, unspecified formulation 09/30/1997, 11/20/1997    PPD Test 02/08/1994    Td, unspecified formulation 02/08/1994        History of PresentIllness:     Mili's had concerns including Follow-up (last labs in 2016); Other (fell off the wan, wants to get re-established); Weight Gain; Shortness of Breath; and Cough. Leigha Parra  presents to the [address] today for;   Chief Complaint   Patient presents with    Follow-up     last labs in 2016    [facility] get [facility]    Weight Gain    Shortness of Breath    Cough   , ,  abnormal labs follow up and these conditions as she  Is looking today for:     1. Hypercholesterolemia    2. IgG Gliadin antibody positive    3.  Vitamin D deficiency    4. Hashimoto's thyroiditis    5. Elevated d-dimer    6. Mixed hyperlipidemia    7. Fatigue, unspecified type    8. Other intestinal malabsorption    9. Blood glucose elevated      HPI    Subjective:     Review of Systems   Respiratory: Positive for cough and shortness of breath. All other systems reviewed and are negative. Objective:     /78 (Site: Left Upper Arm, Position: Sitting, Cuff Size: Medium Adult)   Pulse 78   Temp 97.9 °F (36.6 °C) (Oral)   Resp 10   Ht 4' 8.69\" (1.44 m)   Wt 160 lb 6.4 oz (72.8 kg)   SpO2 98%   BMI 35.09 kg/m²   Physical Exam   Constitutional: She is oriented to person, place, and time. She appears well-developed and well-nourished. HENT:   Head: Normocephalic. Pulmonary/Chest: Effort normal.   Neurological: She is alert and oriented to person, place, and time. Psychiatric: She has a normal mood and affect. Thought content normal.   Nursing note and vitals reviewed. Laboratory Data:   Lab results were searched in Care Everywhere and/or those brought by the pateint were reviewed today with Harleen Nicholas and she has a copy of their most recent labs to take home with them as notedbelow;       Imaging Data:   Imaging Data:       Assessment & Plan:       Impression:  1. Hypercholesterolemia    2. IgG Gliadin antibody positive    3. Vitamin D deficiency    4. Hashimoto's thyroiditis    5. Elevated d-dimer    6. Mixed hyperlipidemia    7. Fatigue, unspecified type    8. Other intestinal malabsorption    9. Blood glucose elevated      Assessment and Plan:  After reviewing the patients chief complaints, reviewing their labfindings in great detail (with the patient and those accompanying them) which correlate to their chief complaints, symptoms, and or medical conditions; suggestions were made relating to changes in diet and or supplementswhich may improve the complaints and which will be reflected in their future lab findings;   Chief Complaint   Patient presents with    Follow-up     last labs in 2016    Other     fell off the wagon, wants to get re-established    Weight Gain    Shortness of Breath    Cough   ;    Plans for the next visits:  - Abnormal and non-optimal Labs were ordered today to be repeated in the next 120-365 days to assess changes from adjustments in nutrition and or nutrients. - Patient instructed when having ablood draw to ask the  to divide their lab draws into multiple draws over several days if not feeling good at the time of the lab draw or if either prefers to do several smaller blood draws over several days  -Patient instructed to check with insurer before each lab draw and to to to the lab which the insurer directs them for the most cost effective lab draw with the least patient's cost  - Irina Russell  will be scheduled subsequentto those results. Dyan Miranda will bring in her drink and food log to her next visit    Chronic Problems Addressed on this Visit:                                   1.  Intensity of Service; Uncontrolled items at this visit; Chief Complaint   Patient presents with    Follow-up     last labs in 2016    Other     fell off the wagon, wants to get re-established    Weight Gain    Shortness of Breath    Cough   ; Improved items at this visit; Stable items atthis visit;  2. Patients food and drinks were reviewed with the patient,       - Irina Russell will bring food+drink symptom log to next visit for inclusion in their record      - 75 better food list reviewed & given topatient with the omega 6 food list to avoid         - Gluten in corn and oats abstracts sheet reviewed and given to the patient today   3. Greater than 25 minutes were spent face to face on this visit of which >50% was for counseling and coordination of care.       Patients food and drinks were reviewed with thepatient,   - they will bring a food drink symptom log to future visits for inclusion in their record    - 75 better food list reviewed & given to patient along with the omega 6 food list to avoid      - Glutenin corn and oats abstracts sheet reviewed and given to the patient today    - 23 Foods containing Latex-like proteins was reviewed and copy to be taken if desired     - Nutrient Supplements list provided and copyto be taken if desired    - Rapid[employer]. The Shared Web web site offered to patient to review at their convenience by staff with login information    Note:  I have discussed with the patient that with all nutraceuticals, there is often mixed data and emerging research which needs to be monitored; as well as an array of NIHfact sheets on nutrients and supplements. If I have recommended cinnamon at the request of this patient to assist them in control of their blood sugar, triglyceride and or weight issues. I discussed that thepatient's clinical use of cinnamon bark, calcium, magnesium, Vitamin D and pharmaceutical grade CVS #958854 fish oil or triple-strength fish oil, and B-75 two phase time-released B complex by Michelle Landaverde will be for atime-limited trial to determine their individual effectiveness and safety in this patient. I also referred the patient to the NMCD: Nutrition, Metabolism, and Cardiovascular Diseases (journal) and concerns about long-termuse and hepatotoxicity of cinnamon and other nutrients and suggest they frequently search nih.gov for the latest non-proprietary information on nutriceuticals as well as consider a subscription to [employer] fordetails on reviewed supplements, or at the least review the nutrient files at [address] at [facility], [employer], an insulin mimetic, reduces some High Carbohydrate Dietary Impacts. Methylhydroxychalcone polymers insulin-enhancing properties in fat cells are responsible for enhanced glucose uptake, inhibiting hepatic HMG-CoA reductase and lowers lipids. www.jacn. org/content/20/4/327.full     But cinnamon with additivessuch as Chromium or Cinnamon Extract are not effective as insulin mimetics. https://www.torres.net/     Nutrients for Start up from [employer] or [employer] for ease to get started now ;  Angelique Kwan has some useable products;  - Triple Strength Fish Oil, enteric coated  - Vit D 3 5000 IU gel caps  - Iron ferrous sulfat 325 mg tabs  - Centrum Silver look-a-like for most patients, or  - Centrum plain look-a-like if need iron    Localpharmacies or chains such as [facility], [facility], Wal-[facility], have;  - Triple Strength Fish Oil (enteric coated ifavailable) or    If not enteric coated, can take from freezer for less burps  - B-50 or B-100 time released balanced B complex tabs  - Cinnamon bark 500 mg (without Chromium or extracts)   some brands list 1000 mg / serving of 2 capsules,    some brands have 1000 mg caps with the undesireable chromium / extract  - Calcium carbonate/citrate, magnesium oxide/citrate, Vit D 3  as 3-4 tabs/caps/serving     Some Local Brands may contain Zincwhich is acceptable for the first bottle or two  - Magnesium oxide 250 mg tabs for those having < 2 bowel movements daily  - Magnesium citrate 200 mg if having > 2bowel movement/day  - Centrum Silver or look-a-like for most patients, Centrum plain or look-a-like with iron  - Vitamin D-3 comes as 1,000 IU or 2,000 IU or 5,000 IU gel caps or Liquid drops      Some brands containing or derived from soy oil or corn oil are OK if not allergic to soy  - Elemental Iron 65 mg tabsat bedtime is available over the counter if need more iron     Usually turns bowel movements grey, green or black but not a concern  - Apricot Kernel Oil (by Now) for dry skin sensitive perineal or perianal area skin    Nutrients for ongoing use by Mail order for less expense from www. puritan.com ;  - Triple Strength Fish Oil , 240 Softgels Item [id number]  -B-100 time released balanced B complex Item #[id number]  - Cinnamon bark 500 mg without Chromium or extract Item #[id number]  - Calcium carbonate 1000 mg, Magnesium oxide 500 mg, Vit D 3  400 IU Item #[id number]  - Magnesium oxide 500 mg tabs Item #122455 if less than 2 bowel movements daily  - [employer] Seniors Item #089232 for mostpatients, One Daily Item #778912 with iron  - Vit D 3  1,000 Item #107417      2,000 IU Item #[id number]  5,000 IU Item #796942     Some brands containing orderived from soy oil or corn oil are OK if not allergic to soy    Nutrients for Special Needs by Roxanna Bath for less expense from www. puritan.com ;  -Elemental Iron 65 mg tabs Item #953682 if need more iron for low iron on labs    Usually turns bowel movements grey, green or black but not a concern  - Time released Niacin 250 mg Item #208746 for cold intolerance, low libido or impotence  - DHEA 50 mg Item #789877 for improving DHEA levels on labs if having Fatigue    If stools too loose substitute for your Magnesium oxide using;   Magnesium citrate 200 mg tabs(NOT liquid) at [employer]   Magnesium gluconate 550 mgby Rikki at [employer] or amazon. com  Magnesium chloride foot soaks or body sprays  www.Simbionix   Magnesium chloride flakes 14.99 Item #: [id number] if Backordered get spray    Food Drink Symptom Log;  I asked this patient to track these items and any other symptoms on their list on a weekly basis to documenttheir progress or lack of same.  This can be done on the symptom tracking sheet I gave them at today's visit but looks like this:                                                      Rate on scale of 0-10 with zero = notnoticeable  Symptom:                            Week 1               2                 3                 4               Etc            Hair loss    Foot cramps    Paresthesia    Aches    IBS (irritable bowel)    Constipation    Diarrhea  Nocturia    (up to bathroom at night)    Fatigue/Energy level  Stress      On the other side of the sheet they can track their food, drink, environment, activity, symptoms etc      Avoiding Latex-like proteins inmy foods; Avocados, Bananas, Celery, Figs & Kiwi proteins have latex-like proteins to inflame our immunesystems  How Can I Have A Latex Allergy? Eating foods with latex-like protein exposes us to latex allergies. Our body cannot tell the differencebetween these latex-like proteins and latex from rubber products since many people are allergic to fruit, vegetables and latex. Read labels on pre-packaged foods. This list to avoid is only a guide if you are known allergicto latex or have a latex rash on your chin, cheeks and lines on your neck and chest. The amount of latex is different in each food product or fruit variety. Foods to Avoid out of Season if not grown locally: Melon, Nectarine, Papaya, Cherry, Passion fruit, Plum, Chestnuts, and Tomato. Avocado, Banana, Celery, Figs, and Kiwi always contain Latex-like protein. Whats in Season? Strawberries taste better in June than December because June is strawberry season so buy locally grown produce \"in season\" for the best flavor, cost and less Latex. Locally grown produce notonly tastes great requires little of no ethylene exposure in food distribution so has less latex content. Out of season, use canned, frozen or dried sinceprocessed ripe and are latex lower!!!   Month     [place] LocallyGrown Produce  January, February, March: use canned, frozen or dried fruits since lower in latex  April; asparagus, radishes  May; asparagus, broccoli, green onions, greens, peas, radishes,rhubarb  June; asparagus, beets, beans, broccoli, cabbage, cantaloupe, carrots, green onions, greens, lettuce,onions, parsley, peas, radishes, rhubarb, strawberries, watermelons  July; beans, beets, blueberries,broccoli, cabbage, cantaloupe, carrots, cauliflower, celery, cucumbers, eggplant, grapes, green onions, greens, lettuce, onions, parsley, peas, peaches, bell peppers, potatoes, radishes, summer raspberries, squash, sweetcorn, tomatoes, turnips, watermelons  August; apples, beans, beets, blueberries, cabbage, cantaloupe, carrots,cauliflower, celery, cucumbers, eggplant, grapes, green onions, greens, lettuce, onions, parsley, peas, peaches, pears, bell peppers, potatoes, radishes, squash, sweet corn, tomatoes, turnips, watermelons  September; apples, beans, beets, blueberries, cabbage, cantaloupe, carrots, cauliflower, celery, cucumbers, eggplant, grapes,green onions, greens, lettuce, onions, parsley, peas, peaches, pears, bell peppers, plums, potatoes, pumpkins, radishes, fall red raspberries, squash, sweet corn, tomatoes, turnips, watermelons  October; apples, beets, broccoli, cabbage, carrots, cauliflower, celery, green onions, greens, lettuce, parsley, peas, pears, potatoes,pumpkins, radishes, fall red raspberries, squash, turnips  November; broccoli, cabbage, carrots, parsley,pears, peas  December: use canned, frozen ordried fruits since lower in latex    Upto half of latex-sensitive patients show allergic reactions to fruits (avocados, bananas, kiwifruits, papayas, peaches),   Annals of Allergy, 1994. These plants contain the same proteins that are allergens in latex. People with fruit allergies should warn physicians beforeundergoing procedures which may cause anaphylactic reaction if in contact with latex gloves. Some of the common foods with defined cross-reactivity to latexare avocado, banana, kiwi, chestnut, raw potato, tomato,stone fruits (e.g., peach, cherry), hazelnut, melons, celery, carrot, apple, pear, papaya, and almond. Foods with less well-defined cross-reactivity to latex are peanuts, peppers, citrus fruits, coconut, pineapple, mendez,fig, passion fruit, Ugli fruit, and grape    This fruit/latex cross-reactivity is worsened by ethylene, a gas used to hasten commercial ripening. In nature, plants produce low levels of the hormone ethylene, which regulates germination, flowering, and ripening.  Forced ripening by high ethyleneconcentrations, plants produce allergenic wound-repair proteins, which are similar to wound-repair proteins made during the tapping of rubber trees. Sensitive individualswho ingest the fruit get a higher dose and worse reaction. Some people may even first become sensitized to latex through fruit. Can food processing increase theconcentrations of allergenic proteins? Latex-sensitized children (and adults) in [place] often experience allergic reactions after eating bananas ripenedartificially with ethylene. In [facility], food distribution centers treat unripe bananas and other produce with ethylene to ripen; not commonly done in [facility] since fruit is tree-ripened there. Does treatmentof food with ethylene induce banana proteins that cross-react with latex? (Gaby et al.    References:   Latex in Foods Allergy, http://ehp.niehs.nih.gov/members/2003/5811/5811.html    Search web for \" Whats in Season \" for whereyou live or are at the time you food shop  www.nutritioncouncil.org/pdf/healthy/SeasonalProduce. pdf ,   Management of Latex, http://medicalcenter. osu.edu/  search for latex

## 2019-06-17 ENCOUNTER — TELEPHONE (OUTPATIENT)
Dept: FAMILY MEDICINE CLINIC | Age: 74
End: 2019-06-17

## 2019-06-17 ENCOUNTER — OFFICE VISIT (OUTPATIENT)
Dept: CARDIOLOGY CLINIC | Age: 74
End: 2019-06-17
Payer: MEDICARE

## 2019-06-17 ENCOUNTER — OFFICE VISIT (OUTPATIENT)
Dept: FAMILY MEDICINE CLINIC | Age: 74
End: 2019-06-17
Payer: MEDICARE

## 2019-06-17 VITALS
TEMPERATURE: 98.2 F | RESPIRATION RATE: 12 BRPM | DIASTOLIC BLOOD PRESSURE: 64 MMHG | HEIGHT: 57 IN | BODY MASS INDEX: 33.27 KG/M2 | HEART RATE: 77 BPM | SYSTOLIC BLOOD PRESSURE: 120 MMHG | WEIGHT: 154.2 LBS | OXYGEN SATURATION: 97 %

## 2019-06-17 VITALS
BODY MASS INDEX: 31.85 KG/M2 | HEART RATE: 80 BPM | WEIGHT: 158 LBS | DIASTOLIC BLOOD PRESSURE: 60 MMHG | SYSTOLIC BLOOD PRESSURE: 118 MMHG | HEIGHT: 59 IN

## 2019-06-17 DIAGNOSIS — R00.2 INTERMITTENT PALPITATIONS: Primary | ICD-10-CM

## 2019-06-17 DIAGNOSIS — R73.9 BLOOD GLUCOSE ELEVATED: ICD-10-CM

## 2019-06-17 DIAGNOSIS — E78.00 HYPERCHOLESTEROLEMIA: ICD-10-CM

## 2019-06-17 DIAGNOSIS — R94.31 ABNORMAL EKG: ICD-10-CM

## 2019-06-17 DIAGNOSIS — R76.8 IGG GLIADIN ANTIBODY POSITIVE: ICD-10-CM

## 2019-06-17 DIAGNOSIS — R53.83 FATIGUE, UNSPECIFIED TYPE: ICD-10-CM

## 2019-06-17 DIAGNOSIS — E78.2 MIXED HYPERLIPIDEMIA: ICD-10-CM

## 2019-06-17 DIAGNOSIS — E55.9 VITAMIN D DEFICIENCY: ICD-10-CM

## 2019-06-17 DIAGNOSIS — K90.89 OTHER INTESTINAL MALABSORPTION: ICD-10-CM

## 2019-06-17 DIAGNOSIS — R06.02 SOB (SHORTNESS OF BREATH): ICD-10-CM

## 2019-06-17 DIAGNOSIS — E06.3 HASHIMOTO'S THYROIDITIS: ICD-10-CM

## 2019-06-17 DIAGNOSIS — E78.00 HYPERCHOLESTEROLEMIA: Primary | ICD-10-CM

## 2019-06-17 DIAGNOSIS — E03.9 ACQUIRED HYPOTHYROIDISM: ICD-10-CM

## 2019-06-17 DIAGNOSIS — J45.20 MILD INTERMITTENT ASTHMA WITHOUT COMPLICATION: ICD-10-CM

## 2019-06-17 DIAGNOSIS — R79.89 ELEVATED D-DIMER: ICD-10-CM

## 2019-06-17 PROCEDURE — 99214 OFFICE O/P EST MOD 30 MIN: CPT | Performed by: INTERNAL MEDICINE

## 2019-06-17 PROCEDURE — G8427 DOCREV CUR MEDS BY ELIG CLIN: HCPCS | Performed by: INTERNAL MEDICINE

## 2019-06-17 PROCEDURE — 1090F PRES/ABSN URINE INCON ASSESS: CPT | Performed by: INTERNAL MEDICINE

## 2019-06-17 PROCEDURE — 3017F COLORECTAL CA SCREEN DOC REV: CPT | Performed by: FAMILY MEDICINE

## 2019-06-17 PROCEDURE — G8399 PT W/DXA RESULTS DOCUMENT: HCPCS | Performed by: FAMILY MEDICINE

## 2019-06-17 PROCEDURE — 1123F ACP DISCUSS/DSCN MKR DOCD: CPT | Performed by: INTERNAL MEDICINE

## 2019-06-17 PROCEDURE — 4040F PNEUMOC VAC/ADMIN/RCVD: CPT | Performed by: INTERNAL MEDICINE

## 2019-06-17 PROCEDURE — G8399 PT W/DXA RESULTS DOCUMENT: HCPCS | Performed by: INTERNAL MEDICINE

## 2019-06-17 PROCEDURE — G8427 DOCREV CUR MEDS BY ELIG CLIN: HCPCS | Performed by: FAMILY MEDICINE

## 2019-06-17 PROCEDURE — G8417 CALC BMI ABV UP PARAM F/U: HCPCS | Performed by: FAMILY MEDICINE

## 2019-06-17 PROCEDURE — 99214 OFFICE O/P EST MOD 30 MIN: CPT | Performed by: FAMILY MEDICINE

## 2019-06-17 PROCEDURE — 1036F TOBACCO NON-USER: CPT | Performed by: INTERNAL MEDICINE

## 2019-06-17 PROCEDURE — 4040F PNEUMOC VAC/ADMIN/RCVD: CPT | Performed by: FAMILY MEDICINE

## 2019-06-17 PROCEDURE — 1123F ACP DISCUSS/DSCN MKR DOCD: CPT | Performed by: FAMILY MEDICINE

## 2019-06-17 PROCEDURE — 3017F COLORECTAL CA SCREEN DOC REV: CPT | Performed by: INTERNAL MEDICINE

## 2019-06-17 PROCEDURE — 1036F TOBACCO NON-USER: CPT | Performed by: FAMILY MEDICINE

## 2019-06-17 PROCEDURE — G8417 CALC BMI ABV UP PARAM F/U: HCPCS | Performed by: INTERNAL MEDICINE

## 2019-06-17 PROCEDURE — 1090F PRES/ABSN URINE INCON ASSESS: CPT | Performed by: FAMILY MEDICINE

## 2019-06-17 RX ORDER — DOXYCYCLINE HYCLATE 100 MG
100 TABLET ORAL 2 TIMES DAILY
Qty: 20 TABLET | Refills: 0 | Status: SHIPPED | OUTPATIENT
Start: 2019-06-17 | End: 2019-09-12 | Stop reason: SDUPTHER

## 2019-06-17 ASSESSMENT — ENCOUNTER SYMPTOMS
SHORTNESS OF BREATH: 1
COUGH: 1

## 2019-06-17 NOTE — TELEPHONE ENCOUNTER
Pt seen with Dr. Basilio Hernandez today, was asking about a injection for osteoporosis? Said the pill is not working out, cannot remember to take them. Please advise next step.

## 2019-06-17 NOTE — LETTER
pharmaceutical fish oil do not clear these then we can consider these other options such as adding a low-dose statin drug, red rice yeast which contains natural pravachol     If these are not effective, we can consider adding or increasing the first phase of the [employer] protocol are effective each effective in lowering the Total Cholesterol and LDL cholesterol;  1) taking 500 mg of L-lysine before meals(mealtimes) and at bedtime, &  2) taking 1000 mg of Vitamin C before meals(mealtimes) and at bedtime, &  3) taking a K2 100 mcg capsule twice a day,  but Do not add K-2 if on warfarin, other blood thinners or if have elevated D-dimer level  (all available at [employer], [employer], [employer] and many health food stores)  Bonus: Lysine taken 4 times a day prevents viruses by blocking their use of arginine    Inflammation Related to Omega 6 Plant oils in diet:   For your high 7.6 % Monocytes to reduce by 1% to get toward the <6% range, and to get your 1.7 % Eosinophils toward <2% for allergies; your RDW and Platelets in reference range; you can add 1 [employer] pharmaceutical grade fish oil cap(90% good) or 1 [address] [facility] [place]-3 from [employer] [facility], (95% good) before meals(mealtimes) and at bedtime   (Do not add Fish Oil if on warfarin, or other blood thinners but remove more offending foods)    Best results come if you can continue to remove seeds, nuts, flax, soy, avocado, hummus, granola, poultry and chips from your diet to reduce the plant oils inflaming your immune system. Search how each of your foods reacts from very good to awful in your body at the SOLOMON-2 at http://mkjfb3brpkyw. UserMojo/ Note: humans can not convert flax or jose to EPA/DHA    Thyroid Functions related to Grains, Carrageenan and Gluten;    For your high 10.11 TSH to get to 1.00, Removing grains, carrageenan and latex foods from the diet improves thyroid gland functioning, bowel health, and several auto-immune tests results as well as preventing grain brain, wheat belly and so many more symptoms / conditions. Other Non-Optimal Lab results to review on your next visit;  Uric Acid:   Lab Results   Component Value Date    URICACID 4.4 08/02/2016      D-Dimer:   Lab Results   Component Value Date    DDIMER 951.71 08/02/2016     IGE:   Lab Results   Component Value Date    IGE 16 12/10/2015     Reminder;   FREE On-line Classes on Nutritional Principles, Removing Toxic Foods, Celiac/ Gluten / Gliadin, 75 Foods for Better Health, Carrageenan, Natamycin and other toxic food additives have been video taped and are now available 24/7 on line to you at [email or web address]. Recovers once you activate your FREE login and password. To access these classes, call my staff at [phone] for login and password    Lab Review Visit needed to write future lab orders based on your symptoms then. These are your results received so far, so review, then call to schedule a video lab review visit or come back in to see me if not clear on how to proceed on your correct path and to get your next orders. We will issue your next lab orders based on your lab letter and your issues discussed during such a lab review visit. In keeping with accountable health plans, at that visit you can let me know which of the lab tests you feel you need to provide you with results to indicate how you are doing with your health issues and concerns    In keeping with accountable health plans, check with your insurer to be sure you are covered at the level you want at the lab you use before getting any of the tests done. We recommend that you repeat the above non-optimal test(s) in 3 months if not satisfied with your health at that time, and as discussed at your lab review visit.     If you have any questions or concerns, please don't hesitate to call my staff or send by [email or web address], if do not use [email or web address], call for an appointment soon.     Sincerely,        Lorin Faye MD

## 2019-06-17 NOTE — PROGRESS NOTES
[address] [facility] [address]  [place] [address]  Dept: [phone]  Dept Fax: [phone]  Loc: Rock Dalton is a 75 y.o. White female. Ani Trivedi  presents to the [facility] today for   Chief Complaint   Patient presents with    Follow-up     No labs done yet    Discuss Labs     done at [address] 5/21/19- scanned in the media     Discuss Medications     Armor thyroid, brought meds not sure if she should take, seems confused   ,  and;   1. Hypercholesterolemia    2. Hashimoto's thyroiditis    3. Fatigue, unspecified type    4. Mild intermittent asthma without complication    5. IgG Gliadin antibody positive    6. Elevated d-dimer    7. Other intestinal malabsorption    8. Acquired hypothyroidism    9. Vitamin D deficiency    10. Mixed hyperlipidemia    11. Blood glucose elevated      I have reviewed [facility] medical, surgical and other pertinent history in detail, and have updated medication and allergy information in the computerized patientrecord. Clinical Care Team:     -Referring Provider for today's consult: Self Referred  -Primary Care Provider: Khadar Stewart DO    Medical/Surgical History:   She  has no past medical history on file. Her  has a past surgical history that includes Hysterectomy; Appendectomy; Tonsillectomy; and Foot surgery (Right). Family/Social History:     Her family history is not on file. She  reports that she has never smoked. She has never used smokeless tobacco. She reports that she does not drink alcohol or use drugs.     Medications/Allergies/Immunizations:     Her current medication(s) include   Current Outpatient Medications:     B Complex-Folic Acid (C-840 BALANCED TR PO), Take 1 tablet by mouth 3 times daily (before meals) [facility] B-100 time released, Disp: , Rfl:     albuterol sulfate  (90 Base) MCG/ACT inhaler, Inhale 2 puffs into the lungs every 6 hours as needed for Wheezing, Disp: 1 Inhaler, Rfl: 5    thyroid (ARMOUR) 15 MG tablet, Take 4 tablets by mouth daily, Disp: 90 tablet, Rfl: 3    Elastic Bandages & Supports (WRIST SPLINT/COCK-UP/LEFT M) MISC, Use daily, Disp: 1 each, Rfl: 0    Elastic Bandages & Supports (WRIST SPLINT/COCK-UP/RIGHT M) MISC, Use daily, Disp: 1 each, Rfl: 0    Cinnamon 500 MG TABS, Take 1,000 mg by mouth daily as needed , Disp: , Rfl:     Omega-3 Fatty Acids (FISH OIL) 1000 MG CAPS, Take 2,000 mg by mouth 4 times daily [employer]S pharmaceutical grade, Disp: , Rfl:     vitamin D (CHOLECALCIFEROL) 5000 UNITS CAPS capsule, Take 5,000 Units by mouth daily as needed , Disp: , Rfl:     MAGNESIUM CITRATE PO, Take 200 mg by mouth 4 times daily (with meals and nightly) Must be TABLET form to not cause diarrhea, Vitaco, Vitamin Julian Camarena, Now, Disp: , Rfl:     b complex vitamins capsule, Take 1 capsule by mouth daily Methyl B-12 plus methyl folate by [facility] as B12 plus, Disp: , Rfl:     Calcium Citrate-Vitamin D (CALCIUM CITRATE + PO), Take 2 tablets by mouth daily as needed , Disp: , Rfl:   Allergies: Patient has no known allergies. ,  Immunizations:   Immunization History   Administered Date(s) Administered    Hepatitis B, unspecified formulation 09/30/1997, 11/20/1997    PPD Test 02/08/1994    Td, unspecified formulation 02/08/1994        History of PresentIllness:     Mili's had concerns including Follow-up (No labs done yet); Discuss Labs (done at [address] 5/21/19- scanned in the media ); and Discuss Medications (Armor thyroid, brought meds not sure if she should take, seems confused). Gerri Pham  presents to the [address] today for;   Chief Complaint   Patient presents with    Follow-up     No labs done yet    Discuss Labs     done [address] 5/21/19- scanned in the media     Discuss Medications     Armor thyroid, brought meds not sure if she should take, seems confused   , ,  abnormal labs follow up and these conditions as she  Is looking today for:     1. Hypercholesterolemia    2. Hashimoto's thyroiditis    3. Fatigue, unspecified type    4. Mild intermittent asthma without complication    5. IgG Gliadin antibody positive    6. Elevated d-dimer    7. Other intestinal malabsorption    8. Acquired hypothyroidism    9. Vitamin D deficiency    10. Mixed hyperlipidemia    11. Blood glucose elevated      HPI    Subjective:     Review of Systems   Respiratory: Positive for cough and shortness of breath. Genitourinary: Positive for frequency. Psychiatric/Behavioral: Positive for decreased concentration. The patient is nervous/anxious. All other systems reviewed and are negative. Objective:     /64 (Site: Left Upper Arm, Position: Sitting, Cuff Size: Medium Adult)   Pulse 77   Temp 98.2 °F (36.8 °C) (Oral)   Resp 12   Ht 4' 8.69\" (1.44 m)   Wt 154 lb 3.2 oz (69.9 kg)   SpO2 97%   BMI 33.73 kg/m²   Physical Exam   Constitutional: She is oriented to person, place, and time. She appears well-developed and well-nourished. HENT:   Head: Normocephalic. Pulmonary/Chest: Effort normal.   Neurological: She is alert and oriented to person, place, and time. Psychiatric: She has a normal mood and affect. Thought content normal.   Nursing note and vitals reviewed. Laboratory Data:   Lab results were searched in Care Everywhere and/or those brought by the pateint were reviewed today with Forrest Dia and she has a copy of their most recent labs to take home with them as notedbelow;       Imaging Data:   Imaging Data:       Assessment & Plan:       Impression:  1. Hypercholesterolemia    2. Hashimoto's thyroiditis    3. Fatigue, unspecified type    4. Mild intermittent asthma without complication    5. IgG Gliadin antibody positive    6. Elevated d-dimer    7. Other intestinal malabsorption    8. Acquired hypothyroidism    9. Vitamin D deficiency    10.  Mixed drinks were reviewed with the patient,       - Yuri Díaz will bring food+drink symptom log to next visit for inclusion in their record      - 75 better food list reviewed & given topatient with the omega 6 food list to avoid         - Gluten in corn and oats abstracts sheet reviewed and given to the patient today   3. Greater than 25 minutes were spent face to face on this visit of which >50% was for counseling and coordination of care. Patients food and drinks were reviewed with thepatient,   - they will bring a food drink symptom log to future visits for inclusion in their record    - 75 better food list reviewed & given to patient along with the omega 6 food list to avoid      - Glutenin corn and oats abstracts sheet reviewed and given to the patient today    - 23 Foods containing Latex-like proteins was reviewed and copy to be taken if desired     - Nutrient Supplements list provided and copyto be taken if desired    - [employer]. [employer] web site offered to patient to review at their convenience by staff with login information    Note:  I have discussed with the patient that with all nutraceuticals, there is often mixed data and emerging research which needs to be monitored; as well as an array of NIHfact sheets on nutrients and supplements. If I have recommended cinnamon at the request of this patient to assist them in control of their blood sugar, triglyceride and or weight issues. I discussed that thepatient's clinical use of cinnamon bark, calcium, magnesium, Vitamin D and pharmaceutical grade CVS #579238 fish oil or triple-strength fish oil, and B-75 two phase time-released B complex by Maurice Valenzuela will be for atime-limited trial to determine their individual effectiveness and safety in this patient.   I also referred the patient to the NMCD: Nutrition, Metabolism, and Cardiovascular Diseases (journal) and concerns about long-termuse and hepatotoxicity of cinnamon and other nutrients and suggest they frequently search nih.gov for the latest non-proprietary information on nutriceuticals as well as consider a subscription to [employer] fordetails on reviewed supplements, or at the least review the nutrient files at [address] at [facility], [place], an insulin mimetic, reduces some High Carbohydrate Dietary Impacts. Methylhydroxychalcone polymers insulin-enhancing properties in fat cells are responsible for enhanced glucose uptake, inhibiting hepatic HMG-CoA reductase and lowers lipids. www.jacn. org/content/20/4/327.full     But cinnamon with additivessuch as Chromium or Cinnamon Extract are not effective as insulin mimetics.  https://www.EcoSynthetix.net/     Nutrients for Start up from [employer] or EndoStim for ease to get started now ;  Angelique Kwan has some useable products;  - Triple Strength Fish Oil, enteric coated  - Vit D 3 5000 IU gel caps  - Iron ferrous sulfat 325 mg tabs  - Centrum Silver look-a-like for most patients, or  - Centrum plain look-a-like if need iron    Localpharmacies or chains such as [employer], [employer], have;  - Triple Strength Fish Oil (enteric coated ifavailable) or    If not enteric coated, can take from freezer for less burps  - B-50 or B-100 time released balanced B complex tabs  - Cinnamon bark 500 mg (without Chromium or extracts)   some brands list 1000 mg / serving of 2 capsules,    some brands have 1000 mg caps with the undesireable chromium / extract  - Calcium carbonate/citrate, magnesium oxide/citrate, Vit D 3  as 3-4 tabs/caps/serving     Some Local Brands may contain Zincwhich is acceptable for the first bottle or two  - Magnesium oxide 250 mg tabs for those having < 2 bowel movements daily  - Magnesium citrate 200 mg if having > 2bowel movement/day  - Centrum Silver or look-a-like for most patients, Centrum plain or look-a-like with iron  - Vitamin D-3 comes as 1,000 IU or 2,000 IU or 5,000 IU gel caps or Liquid drops      Some brands containing or derived from soy oil or corn oil are OK if not allergic to soy  - Elemental Iron 65 mg tabsat bedtime is available over the counter if need more iron     Usually turns bowel movements grey, green or black but not a concern  - Apricot Kernel Oil (by Now) for dry skin sensitive perineal or perianal area skin    Nutrients for ongoing use by Mail order for less expense from www. puritan.com ;  - Triple Strength Fish Oil , 240 Softgels Item [id number]  -B-100 time released balanced B complex Item #[id number]  - Cinnamon bark 500 mg without Chromium or extract Item #[id number]  - Calcium carbonate 1000 mg, Magnesium oxide 500 mg, Vit D 3  400 IU Item #[id number]  - Magnesium oxide 500 mg tabs Item #[id number] if less than 2 bowel movements daily  - [employer] Seniors Item #[id number] for mostpatients, One Daily Item #403129 with iron  - Vit D 3  1,000 Item #[id number]      2,000 IU Item #[id number]  5,000 IU Item #127612     Some brands containing orderived from soy oil or corn oil are OK if not allergic to soy    Nutrients for Special Needs by Nader Hannah for less expense from www. puritan.com ;  -Elemental Iron 65 mg tabs Item #601590 if need more iron for low iron on labs    Usually turns bowel movements grey, green or black but not a concern  - Time released Niacin 250 mg Item #531433 for cold intolerance, low libido or impotence  - DHEA 50 mg Item #841669 for improving DHEA levels on labs if having Fatigue    If stools too loose substitute for your Magnesium oxide using;   Magnesium citrate 200 mg tabs(NOT liquid) at VitaminsOuner. Roojoom   Magnesium gluconate 550 mgby Rikki at [employer]. com or amazon. com  Magnesium chloride foot soaks or body sprays  www."[employer]. Roojoom   Magnesium chloride flakes 14.99 Item #: [id number] if Backordered get spray    Food Drink Symptom Log;  I asked this patient to track these items and any other symptoms on their list on a weekly basis to documenttheir progress or lack of same. This can be done on the symptom tracking sheet I gave them at today's visit but looks like this:                                                      Rate on scale of 0-10 with zero = notnoticeable  Symptom:                            Week 1               2                 3                 4               Etc            Hair loss    Foot cramps    Paresthesia    Aches    IBS (irritable bowel)    Constipation    Diarrhea  Nocturia    (up to bathroom at night)    Fatigue/Energy level  Stress      On the other side of the sheet they can track their food, drink, environment, activity, symptoms etc      Avoiding Latex-like proteins inmy foods; Avocados, Bananas, Celery, Figs & Kiwi proteins have latex-like proteins to inflame our immunesystems  How Can I Have A Latex Allergy? Eating foods with latex-like protein exposes us to latex allergies. Our body cannot tell the differencebetween these latex-like proteins and latex from rubber products since many people are allergic to fruit, vegetables and latex. Read labels on pre-packaged foods. This list to avoid is only a guide if you are known allergicto latex or have a latex rash on your chin, cheeks and lines on your neck and chest. The amount of latex is different in each food product or fruit variety. Foods to Avoid out of Season if not grown locally: Melon, Nectarine, Papaya, Cherry, Passion fruit, Plum, Chestnuts, and Tomato. Avocado, Banana, Celery, Figs, and Kiwi always contain Latex-like protein. Whats in Season? Strawberries taste better in June than December because June is strawberry season so buy locally grown produce \"in season\" for the best flavor, cost and less Latex. Locally grown produce notonly tastes great requires little of no ethylene exposure in food distribution so has less latex content. Out of season, use canned, frozen or dried sinceprocessed ripe and are latex lower!!!   Month     [address] Produce  January, with defined cross-reactivity to latexare avocado, banana, kiwi, chestnut, raw potato, tomato,stone fruits (e.g., peach, cherry), hazelnut, melons, celery, carrot, apple, pear, papaya, and almond. Foods with less well-defined cross-reactivity to latex are peanuts, peppers, citrus fruits, coconut, pineapple, mendez,fig, passion fruit, Ugli fruit, and grape    This fruit/latex cross-reactivity is worsened by ethylene, a gas used to hasten commercial ripening. In nature, plants produce low levels of the hormone ethylene, which regulates germination, flowering, and ripening. Forced ripening by high ethyleneconcentrations, plants produce allergenic wound-repair proteins, which are similar to wound-repair proteins made during the tapping of rubber trees. Sensitive individualswho ingest the fruit get a higher dose and worse reaction. Some people may even first become sensitized to latex through fruit. Can food processing increase theconcentrations of allergenic proteins? Latex-sensitized children (and adults) in [place] often experience allergic reactions after eating bananas ripenedartificially with ethylene. In the [place], food distribution centers treat unripe bananas and other produce with ethylene to ripen; not commonly done in [facility] since fruit is tree-ripened there. Does treatmentof food with ethylene induce banana proteins that cross-react with latex? (Gaby et al.    References:   Latex in Foods Allergy, http://ehp.niehs.nih.gov/members/2003/5811/5811.html    Search web for \" Whats in Season \" for whereyou live or are at the time you food shop  www.nutritioncouncil.org/pdf/healthy/SeasonalProduce. pdf ,   Management of Latex, http://medicalcenter. osu.edu/  search for latex

## 2019-06-17 NOTE — PATIENT INSTRUCTIONS
Thank you   1. Thank you for trusting us with your healthcare needs. You may receive a survey regarding today's visit. It would help us out if you would take a few moments to provide your feedback. We value your input. 2. Please bring in ALL medications BOTTLES, including inhalers, herbal supplements, over the counter, prescribed & non-prescribed medicine. The office would like actual medication bottles and a list.   3. Please note our OFFICE POLICIES:   a. Prior to getting your labs drawn, please check with your insurance company for benefits and eligibility of lab services. Often, insurance companies cover certain tests for preventative visits only. It is patient's responsibility to see what is covered. b. We are unable to change a diagnosis after the test has been performed. c. Lab orders will not be re-printed. Please hold onto your original lab orders and take them to your lab to be completed. d. If you no show your scheduled appointment three times, you will be dismissed from this practice. e. Starlett Dikes must be completed 24 hours prior to your schedule appointment. 4. If the list below has been completed, PLEASE FAX RECORDS TO OUR OFFICE @ [phone].  Once the records have been received we will update your records at our office:  Health Maintenance Due   Topic Date Due    DTaP/Tdap/Td vaccine (1 - Tdap) 02/09/1994    Breast cancer screen  08/26/1994    Shingles Vaccine (1 of 2) 08/26/1994    Colon cancer screen colonoscopy  08/26/1994    Pneumococcal 65+ years Vaccine (1 of 2 - PCV13) 08/26/2009

## 2019-06-17 NOTE — PROGRESS NOTES
Pt here for 6 month f/u. Palpitation better    Occasional palpitations    Sob on exertion    Denied cp,  dizziness     No leg edema    Numbness on left little and ring finger      EKG done on 8-    Never smoked    FHX  None for CAD        Patient Active Problem List   Diagnosis    Hypercholesterolemia    Osteoarthritis    Hypothyroidism    Knee pain, right    Tired    IgG Gliadin antibody positive    Vitamin D deficiency    Hashimoto's thyroiditis    Positive D dimer    Primary osteoarthritis of both hips    Intermittent palpitations    SOB (shortness of breath)    Abnormal EKG    Left arm numbness    Primary osteoarthritis of both knees    Bilateral carpal tunnel syndrome    Lesion of left ulnar nerve    Cervical radiculopathy at C5       Past Surgical History:   Procedure Laterality Date    APPENDECTOMY      FOOT SURGERY Right     toothpick in foot    HYSTERECTOMY      TONSILLECTOMY         No Known Allergies     History reviewed. No pertinent family history.      Social History     Socioeconomic History    Marital status: Single     Spouse name: Not on file    Number of children: Not on file    Years of education: Not on file    Highest education level: Not on file   Occupational History    Not on file   Social Needs    Financial resource strain: Not on file    Food insecurity:     Worry: Not on file     Inability: Not on file    Transportation needs:     Medical: Not on file     Non-medical: Not on file   Tobacco Use    Smoking status: Never Smoker    Smokeless tobacco: Never Used   Substance and Sexual Activity    Alcohol use: No     Alcohol/week: 0.0 oz    Drug use: No    Sexual activity: Never   Lifestyle    Physical activity:     Days per week: Not on file     Minutes per session: Not on file    Stress: Not on file   Relationships    Social connections:     Talks on phone: Not on file     Gets together: Not on file     Attends [employer] service: Not on file Active member of club or organization: Not on file     Attends meetings of clubs or organizations: Not on file     Relationship status: Not on file    Intimate partner violence:     Fear of current or ex partner: Not on file     Emotionally abused: Not on file     Physically abused: Not on file     Forced sexual activity: Not on file   Other Topics Concern    Not on file   Social History Narrative    Not on file       Current Outpatient Medications   Medication Sig Dispense Refill    B Complex-Folic Acid (P-441 BALANCED TR PO) Take 1 tablet by mouth 3 times daily (before meals) [facility] B-100 time released      doxycycline hyclate (VIBRA-TABS) 100 MG tablet Take 1 tablet by mouth 2 times daily for 10 days 20 tablet 0    albuterol sulfate  (90 Base) MCG/ACT inhaler Inhale 2 puffs into the lungs every 6 hours as needed for Wheezing 1 Inhaler 5    thyroid (ARMOUR) 15 MG tablet Take 4 tablets by mouth daily 90 tablet 3    Elastic Bandages & Supports (WRIST SPLINT/COCK-UP/LEFT M) MISC Use daily 1 each 0    Elastic Bandages & Supports (WRIST SPLINT/COCK-UP/RIGHT M) MISC Use daily 1 each 0    Cinnamon 500 MG TABS Take 1,000 mg by mouth daily as needed       Omega-3 Fatty Acids (FISH OIL) 1000 MG CAPS Take 2,000 mg by mouth 4 times daily [employer] pharmaceutical grade      vitamin D (CHOLECALCIFEROL) 5000 UNITS CAPS capsule Take 5,000 Units by mouth daily as needed       MAGNESIUM CITRATE PO Take 200 mg by mouth 4 times daily (with meals and nightly) Must be TABLET form to not cause diarrhea, Vitacost, Vitamin Shoppe, Solgar, Now      b complex vitamins capsule Take 1 capsule by mouth daily Methyl B-12 plus methyl folate by Rich Martines as B12 plus      Calcium Citrate-Vitamin D (CALCIUM CITRATE + PO) Take 2 tablets by mouth daily as needed        No current facility-administered medications for this visit.         Review of Systems -     General ROS: negative  Psychological ROS: negative  Hematological and Lymphatic ROS: No history of blood clots or bleeding disorder. Respiratory ROS: no cough, shortness of breath, or wheezing  Cardiovascular ROS: no chest pain or dyspnea on exertion  Gastrointestinal ROS: negative  Genito-Urinary ROS: no dysuria, trouble voiding, or hematuria  Musculoskeletal ROS: negative  Neurological ROS: no TIA or stroke symptoms  Dermatological ROS: negative      Blood pressure 118/60, pulse 80, height 4' 11\" (1.499 m), weight 158 lb (71.7 kg). Physical Examination:    General appearance - alert, well appearing, and in no distress  Mental status - alert, oriented to person, place, and time  Neck - supple, no significant adenopathy, no JVD, or carotid bruits  Chest - clear to auscultation, no wheezes, rales or rhonchi, symmetric air entry  Heart - normal rate, regular rhythm, normal S1, S2, no murmurs, rubs, clicks or gallops  Abdomen - soft, nontender, nondistended, no masses or organomegaly  Neurological - alert, oriented, normal speech, no focal findings or movement disorder noted  Musculoskeletal - no joint tenderness, deformity or swelling  Extremities - peripheral pulses normal, no pedal edema, no clubbing or cyanosis  Skin - normal coloration and turgor, no rashes, no suspicious skin lesions noted    Lab  No results for input(s): CKTOTAL, CKMB, CKMBINDEX, TROPONINI in the last 72 hours.   CBC:   Lab Results   Component Value Date    WBC 7.0 08/22/2018    RBC 4.40 08/22/2018    HGB 13.5 08/22/2018    HCT 39.8 08/22/2018    MCV 90.5 08/22/2018    MCH 30.7 08/22/2018    MCHC 33.9 08/22/2018    RDW 14.2 04/10/2018     08/22/2018    MPV 10.0 08/22/2018     BMP:    Lab Results   Component Value Date     08/22/2018    K 3.6 08/22/2018     08/22/2018    CO2 23 08/22/2018    BUN 13 08/22/2018    LABALBU 4.3 04/10/2018    CREATININE 0.6 08/22/2018    CALCIUM 9.0 08/22/2018    LABGLOM >90 08/22/2018    GLUCOSE 90 08/22/2018     Hepatic Function Panel:    Lab Results   Component Value Date    ALKPHOS 103 04/10/2018    ALT 7 04/10/2018    AST 12 04/10/2018    PROT 7.8 04/10/2018    BILITOT 0.3 04/10/2018    BILIDIR <0.2 04/10/2018    LABALBU 4.3 04/10/2018     Magnesium:    Lab Results   Component Value Date    MG 2.0 08/22/2018     Warfarin PT/INR:  No components found for: PTPATWAR, PTINRWAR  HgBA1c:    Lab Results   Component Value Date    LABA1C 5.8 11/27/2018    LABA1C 5.9 12/10/2015     FLP:    Lab Results   Component Value Date    TRIG 82 08/22/2018    HDL 66 08/22/2018    LDLCALC 153 08/22/2018     TSH:    Lab Results   Component Value Date    TSH 11.170 08/22/2018       EKG 10/9/18  Sinus  Rhythm   Low voltage with rightward P-axis and rotation -possible pulmonary disease. ABNORMAL       CONCLUSION:  This is a normal sinus rhythm. Average heart rate of 77  beats per minute, ranging from 49 to 135 beats per minute. No  significant pause of more than 1.6 seconds noted.     Rare ventricular ectopic beats, total of 12, all isolated. Rare  supraventricular ectopic beats, total of 116 isolated, couplet, and rare  supraventricular ectopic run has been noted. So, basically no evidence  of atrial fibrillation and no correlation between the symptoms. The  symptoms of the patient correlate with normal sinus rhythm.           GLENYS Laird M.D.     D: 11/14/2018 19:24:59    Assessment   Diagnosis Orders   1. Intermittent palpitations  Lipid Panel    Hepatic Function Panel   2. SOB (shortness of breath)  Lipid Panel    Hepatic Function Panel   3. Hypercholesterolemia  Lipid Panel    Hepatic Function Panel   4. Abnormal EKG  Lipid Panel    Hepatic Function Panel       Plan   The meds and labs reviewed    Continue the current treatment and with constant vigilance to changes in symptoms and also any potential side effects. Return for care or seek medical attention immediately if symptoms got worse and/or develop new symptoms.     Palpitations- better  lef arm numness- small finger for 2 months  Advised to talk to pcp for evaluation of the nural related numbness of the two fingers    Holter 48 hrs, Echo and lexiscan nuc- all WNL     Hyperlipidemia: not on statins, followed periodically.  Need non-drug RX  Lipid panel and liver function test before next appointment  Consider statin if remain high after Rx hypopthyroidism    D/w the pat the plan of care    RTC  6 months      [facility]

## 2019-06-17 NOTE — TELEPHONE ENCOUNTER
Advised pt we sent in script, and to start taking the fish oil and other supplements to help with arthritis. She voiced understanding.

## 2019-07-01 ENCOUNTER — TELEPHONE (OUTPATIENT)
Dept: FAMILY MEDICINE CLINIC | Age: 74
End: 2019-07-01

## 2019-07-25 DIAGNOSIS — M81.0 AGE-RELATED OSTEOPOROSIS WITHOUT CURRENT PATHOLOGICAL FRACTURE: ICD-10-CM

## 2019-07-25 RX ORDER — ALENDRONATE SODIUM 70 MG/1
70 TABLET ORAL
Qty: 4 TABLET | Refills: 3 | Status: SHIPPED | OUTPATIENT
Start: 2019-07-25 | End: 2019-12-19 | Stop reason: SDUPTHER

## 2019-09-11 ENCOUNTER — TELEPHONE (OUTPATIENT)
Dept: FAMILY MEDICINE CLINIC | Age: 74
End: 2019-09-11

## 2019-09-11 NOTE — TELEPHONE ENCOUNTER
Pt called wondering if she can get another round of medication because she is getting her pneumonia back. Please advise.

## 2019-09-12 RX ORDER — DOXYCYCLINE HYCLATE 100 MG
100 TABLET ORAL 2 TIMES DAILY
Qty: 40 TABLET | Refills: 0 | Status: SHIPPED | OUTPATIENT
Start: 2019-09-12 | End: 2019-10-02

## 2019-09-20 ENCOUNTER — TELEPHONE (OUTPATIENT)
Dept: FAMILY MEDICINE CLINIC | Age: 74
End: 2019-09-20

## 2019-11-11 ENCOUNTER — TELEPHONE (OUTPATIENT)
Dept: FAMILY MEDICINE CLINIC | Age: 74
End: 2019-11-11

## 2019-11-11 DIAGNOSIS — R06.02 SOB (SHORTNESS OF BREATH): Primary | ICD-10-CM

## 2019-11-26 ENCOUNTER — TELEPHONE (OUTPATIENT)
Dept: FAMILY MEDICINE CLINIC | Age: 74
End: 2019-11-26

## 2019-12-10 ENCOUNTER — TELEPHONE (OUTPATIENT)
Dept: FAMILY MEDICINE CLINIC | Age: 74
End: 2019-12-10

## 2019-12-10 ENCOUNTER — OFFICE VISIT (OUTPATIENT)
Dept: FAMILY MEDICINE CLINIC | Age: 74
End: 2019-12-10
Payer: MEDICARE

## 2019-12-10 ENCOUNTER — OFFICE VISIT (OUTPATIENT)
Dept: CARDIOLOGY CLINIC | Age: 74
End: 2019-12-10
Payer: MEDICARE

## 2019-12-10 VITALS
HEIGHT: 59 IN | DIASTOLIC BLOOD PRESSURE: 66 MMHG | HEART RATE: 88 BPM | WEIGHT: 152 LBS | TEMPERATURE: 98.2 F | OXYGEN SATURATION: 97 % | SYSTOLIC BLOOD PRESSURE: 118 MMHG | BODY MASS INDEX: 30.64 KG/M2 | RESPIRATION RATE: 12 BRPM

## 2019-12-10 VITALS
HEART RATE: 74 BPM | DIASTOLIC BLOOD PRESSURE: 73 MMHG | HEIGHT: 59 IN | SYSTOLIC BLOOD PRESSURE: 142 MMHG | BODY MASS INDEX: 31.04 KG/M2 | WEIGHT: 154 LBS

## 2019-12-10 DIAGNOSIS — R06.02 SOB (SHORTNESS OF BREATH): ICD-10-CM

## 2019-12-10 DIAGNOSIS — E78.00 HYPERCHOLESTEROLEMIA: Primary | ICD-10-CM

## 2019-12-10 DIAGNOSIS — K90.89 OTHER INTESTINAL MALABSORPTION: ICD-10-CM

## 2019-12-10 DIAGNOSIS — E78.00 HYPERCHOLESTEROLEMIA: ICD-10-CM

## 2019-12-10 DIAGNOSIS — R79.89 ELEVATED D-DIMER: ICD-10-CM

## 2019-12-10 DIAGNOSIS — E55.9 VITAMIN D DEFICIENCY: ICD-10-CM

## 2019-12-10 DIAGNOSIS — M81.0 AGE-RELATED OSTEOPOROSIS WITHOUT CURRENT PATHOLOGICAL FRACTURE: ICD-10-CM

## 2019-12-10 DIAGNOSIS — R00.2 INTERMITTENT PALPITATIONS: ICD-10-CM

## 2019-12-10 DIAGNOSIS — J45.20 MILD INTERMITTENT ASTHMA WITHOUT COMPLICATION: ICD-10-CM

## 2019-12-10 DIAGNOSIS — R53.83 FATIGUE, UNSPECIFIED TYPE: ICD-10-CM

## 2019-12-10 DIAGNOSIS — R06.02 SOB (SHORTNESS OF BREATH): Primary | ICD-10-CM

## 2019-12-10 DIAGNOSIS — R06.02 SOB (SHORTNESS OF BREATH) ON EXERTION: ICD-10-CM

## 2019-12-10 DIAGNOSIS — M54.12 CERVICAL RADICULOPATHY AT C5: ICD-10-CM

## 2019-12-10 DIAGNOSIS — R94.31 ABNORMAL EKG: ICD-10-CM

## 2019-12-10 DIAGNOSIS — E03.9 ACQUIRED HYPOTHYROIDISM: ICD-10-CM

## 2019-12-10 DIAGNOSIS — F41.9 ANXIETY: ICD-10-CM

## 2019-12-10 DIAGNOSIS — E06.3 HASHIMOTO'S THYROIDITIS: ICD-10-CM

## 2019-12-10 DIAGNOSIS — R76.8 IGG GLIADIN ANTIBODY POSITIVE: ICD-10-CM

## 2019-12-10 DIAGNOSIS — G56.03 BILATERAL CARPAL TUNNEL SYNDROME: ICD-10-CM

## 2019-12-10 DIAGNOSIS — M16.0 PRIMARY OSTEOARTHRITIS OF BOTH HIPS: ICD-10-CM

## 2019-12-10 PROCEDURE — G8427 DOCREV CUR MEDS BY ELIG CLIN: HCPCS | Performed by: FAMILY MEDICINE

## 2019-12-10 PROCEDURE — 1123F ACP DISCUSS/DSCN MKR DOCD: CPT | Performed by: FAMILY MEDICINE

## 2019-12-10 PROCEDURE — G8484 FLU IMMUNIZE NO ADMIN: HCPCS | Performed by: INTERNAL MEDICINE

## 2019-12-10 PROCEDURE — G8399 PT W/DXA RESULTS DOCUMENT: HCPCS | Performed by: FAMILY MEDICINE

## 2019-12-10 PROCEDURE — 1090F PRES/ABSN URINE INCON ASSESS: CPT | Performed by: FAMILY MEDICINE

## 2019-12-10 PROCEDURE — 3017F COLORECTAL CA SCREEN DOC REV: CPT | Performed by: FAMILY MEDICINE

## 2019-12-10 PROCEDURE — G8417 CALC BMI ABV UP PARAM F/U: HCPCS | Performed by: FAMILY MEDICINE

## 2019-12-10 PROCEDURE — 1090F PRES/ABSN URINE INCON ASSESS: CPT | Performed by: INTERNAL MEDICINE

## 2019-12-10 PROCEDURE — 99213 OFFICE O/P EST LOW 20 MIN: CPT | Performed by: INTERNAL MEDICINE

## 2019-12-10 PROCEDURE — G8399 PT W/DXA RESULTS DOCUMENT: HCPCS | Performed by: INTERNAL MEDICINE

## 2019-12-10 PROCEDURE — 1036F TOBACCO NON-USER: CPT | Performed by: FAMILY MEDICINE

## 2019-12-10 PROCEDURE — 99214 OFFICE O/P EST MOD 30 MIN: CPT | Performed by: FAMILY MEDICINE

## 2019-12-10 PROCEDURE — 4040F PNEUMOC VAC/ADMIN/RCVD: CPT | Performed by: INTERNAL MEDICINE

## 2019-12-10 PROCEDURE — 3017F COLORECTAL CA SCREEN DOC REV: CPT | Performed by: INTERNAL MEDICINE

## 2019-12-10 PROCEDURE — 1123F ACP DISCUSS/DSCN MKR DOCD: CPT | Performed by: INTERNAL MEDICINE

## 2019-12-10 PROCEDURE — 1036F TOBACCO NON-USER: CPT | Performed by: INTERNAL MEDICINE

## 2019-12-10 PROCEDURE — G8427 DOCREV CUR MEDS BY ELIG CLIN: HCPCS | Performed by: INTERNAL MEDICINE

## 2019-12-10 PROCEDURE — G8484 FLU IMMUNIZE NO ADMIN: HCPCS | Performed by: FAMILY MEDICINE

## 2019-12-10 PROCEDURE — 4040F PNEUMOC VAC/ADMIN/RCVD: CPT | Performed by: FAMILY MEDICINE

## 2019-12-10 PROCEDURE — G8417 CALC BMI ABV UP PARAM F/U: HCPCS | Performed by: INTERNAL MEDICINE

## 2019-12-10 RX ORDER — PRAVASTATIN SODIUM 20 MG
20 TABLET ORAL DAILY
Qty: 30 TABLET | Refills: 7 | Status: SHIPPED | OUTPATIENT
Start: 2019-12-10 | End: 2021-01-29 | Stop reason: SDUPTHER

## 2019-12-10 RX ORDER — LEVOTHYROXINE AND LIOTHYRONINE 9.5; 2.25 UG/1; UG/1
60 TABLET ORAL DAILY
Qty: 90 TABLET | Refills: 3 | Status: SHIPPED | OUTPATIENT
Start: 2019-12-10 | End: 2020-07-10 | Stop reason: SDUPTHER

## 2019-12-10 RX ORDER — LEVOTHYROXINE SODIUM 0.03 MG/1
25 TABLET ORAL DAILY
Qty: 90 TABLET | Refills: 1 | Status: SHIPPED | OUTPATIENT
Start: 2019-12-10 | End: 2020-06-01 | Stop reason: SDUPTHER

## 2019-12-10 ASSESSMENT — ENCOUNTER SYMPTOMS
BACK PAIN: 1
DIARRHEA: 1
CONSTIPATION: 1

## 2019-12-11 ENCOUNTER — PATIENT MESSAGE (OUTPATIENT)
Dept: FAMILY MEDICINE CLINIC | Age: 74
End: 2019-12-11

## 2019-12-13 LAB
CHOLESTEROL, TOTAL: 236 MG/DL
CHOLESTEROL/HDL RATIO: 4.92
HDLC SERPL-MCNC: 48 MG/DL (ref 35–70)
LDL CHOLESTEROL CALCULATED: 155 MG/DL (ref 0–160)
TRIGL SERPL-MCNC: 165 MG/DL
VLDLC SERPL CALC-MCNC: 33 MG/DL

## 2019-12-17 ENCOUNTER — TELEPHONE (OUTPATIENT)
Dept: FAMILY MEDICINE CLINIC | Age: 74
End: 2019-12-17

## 2019-12-19 DIAGNOSIS — M81.0 AGE-RELATED OSTEOPOROSIS WITHOUT CURRENT PATHOLOGICAL FRACTURE: ICD-10-CM

## 2019-12-19 RX ORDER — ALENDRONATE SODIUM 70 MG/1
70 TABLET ORAL
Qty: 4 TABLET | Refills: 3 | Status: SHIPPED | OUTPATIENT
Start: 2019-12-19 | End: 2021-01-29 | Stop reason: SDUPTHER

## 2020-03-16 ENCOUNTER — TELEPHONE (OUTPATIENT)
Dept: FAMILY MEDICINE CLINIC | Age: 75
End: 2020-03-16

## 2020-06-01 RX ORDER — LEVOTHYROXINE SODIUM 0.03 MG/1
25 TABLET ORAL DAILY
Qty: 90 TABLET | Refills: 1 | Status: SHIPPED | OUTPATIENT
Start: 2020-06-01 | End: 2021-01-29 | Stop reason: SDUPTHER

## 2020-07-10 RX ORDER — ALBUTEROL SULFATE 90 UG/1
2 AEROSOL, METERED RESPIRATORY (INHALATION) EVERY 6 HOURS PRN
Qty: 1 INHALER | Refills: 5 | Status: SHIPPED | OUTPATIENT
Start: 2020-07-10 | End: 2021-07-22 | Stop reason: SDUPTHER

## 2020-07-10 RX ORDER — LEVOTHYROXINE AND LIOTHYRONINE 9.5; 2.25 UG/1; UG/1
60 TABLET ORAL DAILY
Qty: 90 TABLET | Refills: 3 | Status: SHIPPED | OUTPATIENT
Start: 2020-07-10 | End: 2021-01-29 | Stop reason: SDUPTHER

## 2020-07-10 NOTE — TELEPHONE ENCOUNTER
Galen Fernandes called requesting a refill on the following medications:  Requested Prescriptions     Pending Prescriptions Disp Refills    albuterol sulfate  (90 Base) MCG/ACT inhaler 1 Inhaler 5     Sig: Inhale 2 puffs into the lungs every 6 hours as needed for Wheezing    thyroid (ARMOUR) 15 MG tablet 90 tablet 3     Sig: Take 4 tablets by mouth daily     Pharmacy verified:  [email or web address]  Send before 3 pm so they could fill this today    Date of last visit: 12/10/2019  Date of next visit (if applicable): 33/76/8435    Patient is requesting to have more than one refill on these prescriptions sent on these so she doesn't have to keep calling our office.

## 2020-08-31 ENCOUNTER — TELEPHONE (OUTPATIENT)
Dept: CARDIOLOGY CLINIC | Age: 75
End: 2020-08-31

## 2020-08-31 NOTE — TELEPHONE ENCOUNTER
Patient was last seen in the office 12/10/2019. She called today to cancel her appointment for 09/01/2020. She said she is out of town and will not be able to make it for a couple of months and wanted to wait to reschedule. She asked if Dr. Bonnie St wanted to have any testing ordered for her that [facility] could [facility] that she could have done prior to the visit to bring with her to the appointment. I spoke with a nurse that advised that she would need to be seen prior to orders being done. Patient was given this information but said she thought it would save time and be easier for the  If she was able to get those done prior and bring them with her to the appointment.   Please advise

## 2021-01-27 DIAGNOSIS — E03.9 ACQUIRED HYPOTHYROIDISM: ICD-10-CM

## 2021-01-27 DIAGNOSIS — M81.0 AGE-RELATED OSTEOPOROSIS WITHOUT CURRENT PATHOLOGICAL FRACTURE: ICD-10-CM

## 2021-01-27 NOTE — TELEPHONE ENCOUNTER
Madeline Albrecht called requesting a refill on the following medications:  Requested Prescriptions     Pending Prescriptions Disp Refills    levothyroxine (SYNTHROID) 25 MCG tablet 90 tablet 1     Sig: Take 1 tablet by mouth daily    thyroid (ARMOUR) 15 MG tablet 90 tablet 3     Sig: Take 4 tablets by mouth daily    pravastatin (PRAVACHOL) 20 MG tablet 30 tablet 7     Sig: Take 1 tablet by mouth daily    alendronate (FOSAMAX) 70 MG tablet 4 tablet 3     Sig: Take 1 tablet by mouth every 7 days     Pharmacy verified:  .pv      Date of last visit:9/1/2020   Date of next visit (if applicable): Visit date not found    Patient is requesting for a  90 day supply

## 2021-01-27 NOTE — TELEPHONE ENCOUNTER
Gricelda Richardson called requesting a refill on the following medications:  Requested Prescriptions     Pending Prescriptions Disp Refills    levothyroxine (SYNTHROID) 25 MCG tablet 90 tablet 1     Sig: Take 1 tablet by mouth daily    thyroid (ARMOUR) 15 MG tablet 90 tablet 3     Sig: Take 4 tablets by mouth daily    pravastatin (PRAVACHOL) 20 MG tablet 30 tablet 7     Sig: Take 1 tablet by mouth daily     Pharmacy verified: yes    90 day Prescription needed.     .pv      Date of last visit: 9/1/2020  Date of next visit (if applicable): Visit date not found

## 2021-01-29 RX ORDER — PRAVASTATIN SODIUM 20 MG
20 TABLET ORAL DAILY
Qty: 30 TABLET | Refills: 1 | Status: SHIPPED | OUTPATIENT
Start: 2021-01-29 | End: 2021-04-06 | Stop reason: SDUPTHER

## 2021-01-29 RX ORDER — LEVOTHYROXINE AND LIOTHYRONINE 9.5; 2.25 UG/1; UG/1
60 TABLET ORAL DAILY
Qty: 90 TABLET | Refills: 1 | Status: SHIPPED | OUTPATIENT
Start: 2021-01-29 | End: 2021-04-06 | Stop reason: SDUPTHER

## 2021-01-29 RX ORDER — LEVOTHYROXINE SODIUM 0.03 MG/1
25 TABLET ORAL DAILY
Qty: 30 TABLET | Refills: 1 | Status: SHIPPED | OUTPATIENT
Start: 2021-01-29 | End: 2021-04-06 | Stop reason: SDUPTHER

## 2021-01-29 RX ORDER — ALENDRONATE SODIUM 70 MG/1
70 TABLET ORAL
Qty: 4 TABLET | Refills: 0 | Status: SHIPPED | OUTPATIENT
Start: 2021-01-29 | End: 2021-03-15 | Stop reason: SDUPTHER

## 2021-01-29 NOTE — TELEPHONE ENCOUNTER
Can we see her in the office or on video visit in the next month to go over the labs? I did give a month or so of meds but we need to follow this more closely and need to recheck her symptoms and the thyroid meds - thanks!

## 2021-03-15 DIAGNOSIS — M81.0 AGE-RELATED OSTEOPOROSIS WITHOUT CURRENT PATHOLOGICAL FRACTURE: ICD-10-CM

## 2021-03-15 RX ORDER — ALENDRONATE SODIUM 70 MG/1
70 TABLET ORAL
Qty: 4 TABLET | Refills: 0 | Status: SHIPPED | OUTPATIENT
Start: 2021-03-15 | End: 2021-04-06 | Stop reason: SDUPTHER

## 2021-03-15 NOTE — TELEPHONE ENCOUNTER
Cassidy Wilson called requesting a refill on the following medications:  Requested Prescriptions     Pending Prescriptions Disp Refills    alendronate (FOSAMAX) 70 MG tablet 4 tablet 0     Sig: Take 1 tablet by mouth every 7 days     Pharmacy verified:  [email or web address]      Date of last visit: 9/1/2020  Date of next visit (if applicable): 3/2/9225

## 2021-03-30 ENCOUNTER — TELEPHONE (OUTPATIENT)
Dept: FAMILY MEDICINE CLINIC | Age: 76
End: 2021-03-30

## 2021-03-30 DIAGNOSIS — M81.0 AGE-RELATED OSTEOPOROSIS WITHOUT CURRENT PATHOLOGICAL FRACTURE: ICD-10-CM

## 2021-03-30 DIAGNOSIS — E03.9 ACQUIRED HYPOTHYROIDISM: ICD-10-CM

## 2021-03-30 NOTE — TELEPHONE ENCOUNTER
Spoke to pt and she wants labs orders and a CT of her lungs and heart to make sure everything is good because she said she has a Hx of \"blood clots in her lungs\". She wants this before her machelle she wants to get scheduled here. I tried to call Dr. Buck Boys office and left a VM trying to coordinate an appointment.

## 2021-03-30 NOTE — TELEPHONE ENCOUNTER
[facility] received a call from:    Name of Caller: Renu Le    Relationship to patient: Self    Organization name: N/A    Best contact number: [phone]    Reason for call: Pt called because she is having issues finding a date and time that works with Dr. Todd Nevarez and Dr. Garland Dong schedule. She lives in [place] and would like to find a day where she could see both providers. Pt is requesting Dr. Samuel Hassan office get a hold of Dr. Garland Dong office to work this out. Any day but no earlier than 10am, pt would like a call back with appointment times.

## 2021-04-06 RX ORDER — LEVOTHYROXINE SODIUM 0.03 MG/1
25 TABLET ORAL DAILY
Qty: 90 TABLET | Refills: 0 | Status: SHIPPED | OUTPATIENT
Start: 2021-04-06 | End: 2021-11-09 | Stop reason: SDUPTHER

## 2021-04-06 RX ORDER — ALENDRONATE SODIUM 70 MG/1
70 TABLET ORAL
Qty: 4 TABLET | Refills: 0 | Status: SHIPPED | OUTPATIENT
Start: 2021-04-06 | End: 2021-11-09 | Stop reason: SDUPTHER

## 2021-04-06 RX ORDER — LEVOTHYROXINE AND LIOTHYRONINE 9.5; 2.25 UG/1; UG/1
60 TABLET ORAL DAILY
Qty: 360 TABLET | Refills: 0 | Status: SHIPPED | OUTPATIENT
Start: 2021-04-06 | End: 2021-11-09 | Stop reason: SDUPTHER

## 2021-04-06 RX ORDER — PRAVASTATIN SODIUM 20 MG
20 TABLET ORAL DAILY
Qty: 90 TABLET | Refills: 0 | Status: SHIPPED | OUTPATIENT
Start: 2021-04-06 | End: 2021-11-09 | Stop reason: SDUPTHER

## 2021-04-06 NOTE — TELEPHONE ENCOUNTER
RAMO HICKS, DO  [place] informed and states that she would like the testing of labs and CT scan to be ordered prior to her visit with you and Dr. Arnav Rodriguez so she can review them with you during her visit. She states that she feels it is not worth the drive from [facility], [facility] without having resuts to discuss and she also states that Dr. Anna Foley office also agrees for her to have testing done prior to her visit. Also would like refill for 90 days on her pended medications. Please Advise.

## 2021-04-06 NOTE — TELEPHONE ENCOUNTER
So sorry - missed the meds - did call in 90 days - but do need to see her - July is kind of far away - can she see a resident before then and then I will get on the phone right after?

## 2021-04-06 NOTE — TELEPHONE ENCOUNTER
So sorry - we can not order the tests until we have a note and physical exam documenting why we are ordering. Can she ask Dr Miguel Romero to order the labs he would like? Would she want to see us on a video call? Sometimes that works for insurance purposes to get these tests paid for?

## 2021-04-12 NOTE — TELEPHONE ENCOUNTER
Spoke with pt, pt states she lives in [place] and she can't be driving back and forth all the time. Pt states she can wait until July 19th, 2021 for her Virtual visit with Darrelyn Schirmer CNP.

## 2021-07-19 ENCOUNTER — TELEPHONE (OUTPATIENT)
Dept: FAMILY MEDICINE CLINIC | Age: 76
End: 2021-07-19

## 2021-07-19 NOTE — TELEPHONE ENCOUNTER
Tried to contact Anthony Vargas CNP for her virtual visit today and phone kept ringing with no answer.

## 2021-07-22 ENCOUNTER — VIRTUAL VISIT (OUTPATIENT)
Dept: FAMILY MEDICINE CLINIC | Age: 76
End: 2021-07-22
Payer: MEDICARE

## 2021-07-22 ENCOUNTER — TELEPHONE (OUTPATIENT)
Dept: FAMILY MEDICINE CLINIC | Age: 76
End: 2021-07-22

## 2021-07-22 DIAGNOSIS — M81.0 AGE-RELATED OSTEOPOROSIS WITHOUT CURRENT PATHOLOGICAL FRACTURE: ICD-10-CM

## 2021-07-22 DIAGNOSIS — R53.83 FATIGUE, UNSPECIFIED TYPE: ICD-10-CM

## 2021-07-22 DIAGNOSIS — J21.9 ACUTE BRONCHIOLITIS DUE TO UNSPECIFIED ORGANISM: Primary | ICD-10-CM

## 2021-07-22 DIAGNOSIS — J45.20 MILD INTERMITTENT ASTHMA WITHOUT COMPLICATION: ICD-10-CM

## 2021-07-22 DIAGNOSIS — M17.0 PRIMARY OSTEOARTHRITIS OF BOTH KNEES: ICD-10-CM

## 2021-07-22 DIAGNOSIS — R39.15 URINARY URGENCY: ICD-10-CM

## 2021-07-22 PROCEDURE — G8399 PT W/DXA RESULTS DOCUMENT: HCPCS | Performed by: NURSE PRACTITIONER

## 2021-07-22 PROCEDURE — 1090F PRES/ABSN URINE INCON ASSESS: CPT | Performed by: NURSE PRACTITIONER

## 2021-07-22 PROCEDURE — 1123F ACP DISCUSS/DSCN MKR DOCD: CPT | Performed by: NURSE PRACTITIONER

## 2021-07-22 PROCEDURE — 99214 OFFICE O/P EST MOD 30 MIN: CPT | Performed by: NURSE PRACTITIONER

## 2021-07-22 PROCEDURE — 4040F PNEUMOC VAC/ADMIN/RCVD: CPT | Performed by: NURSE PRACTITIONER

## 2021-07-22 PROCEDURE — G8427 DOCREV CUR MEDS BY ELIG CLIN: HCPCS | Performed by: NURSE PRACTITIONER

## 2021-07-22 RX ORDER — LORATADINE 10 MG/1
10 TABLET ORAL DAILY
Qty: 30 TABLET | Refills: 2 | Status: SHIPPED | OUTPATIENT
Start: 2021-07-22

## 2021-07-22 RX ORDER — NAPROXEN 250 MG/1
250 TABLET ORAL 2 TIMES DAILY WITH MEALS
Qty: 180 TABLET | Refills: 1 | Status: SHIPPED | OUTPATIENT
Start: 2021-07-22

## 2021-07-22 RX ORDER — DOXYCYCLINE HYCLATE 100 MG
100 TABLET ORAL 2 TIMES DAILY
Qty: 10 TABLET | Refills: 0 | Status: SHIPPED | OUTPATIENT
Start: 2021-07-22 | End: 2021-07-27

## 2021-07-22 RX ORDER — ALBUTEROL SULFATE 90 UG/1
2 AEROSOL, METERED RESPIRATORY (INHALATION) EVERY 6 HOURS PRN
Qty: 1 INHALER | Refills: 5 | Status: SHIPPED | OUTPATIENT
Start: 2021-07-22 | End: 2021-11-08 | Stop reason: SDUPTHER

## 2021-07-22 NOTE — TELEPHONE ENCOUNTER
Jayro Rain is provided with the recommendations from REBECA Johnson. Jayro Rain would like the lab order for the urinalysis and the AVS sent to her in the mail. Jayro Rain verified her address.

## 2021-07-22 NOTE — TELEPHONE ENCOUNTER
Allan Moore called the office requesting a medication or exercise recommendations from REBECA Willoughby for bladder urgency. Allan Moore states she would like to know if there is a medication or exercises that will relieve her symptoms of bladder urgency, or the need to use menstrual pads; she states that it is hard to hold. Please advise.

## 2021-07-22 NOTE — PROGRESS NOTES
Otis Bass (:  1945) is a 67 y.o. female,Established patient, here for evaluation of the following chief complaint(s): Cough, Referral - General (Rheumatology ), and Medication Refill    Cough and nasal congestion x 2 months - had pneumonia in the past - Doxy worked well for her    Needs refill of inhaler     Arthritis is causing lots of pain in her knees - told she has RA - not currently seeing a Rheumatologit    Patient called after a visit and ask staff to mention to me that she is having issues with urinary incontinence, urgency, frequency, has trouble holding her bladder at times. Was wondering if there are any exercises or medications that she could use to help with this. ASSESSMENT/PLAN:  1. Acute bronchiolitis due to unspecified organism  -     doxycycline hyclate (VIBRA-TABS) 100 MG tablet; Take 1 tablet by mouth 2 times daily for 5 days, Disp-10 tablet, R-0Normal  -     loratadine (CLARITIN) 10 MG tablet; Take 1 tablet by mouth daily, Disp-30 tablet, R-2Normal  2. Mild intermittent asthma without complication  -     albuterol sulfate  (90 Base) MCG/ACT inhaler; Inhale 2 puffs into the lungs every 6 hours as needed for Wheezing, Disp-1 Inhaler, R-5Normal  3. Fatigue, unspecified type  4. Age-related osteoporosis without current pathological fracture  -     naproxen (NAPROSYN) 250 MG tablet; Take 1 tablet by mouth 2 times daily (with meals), Disp-180 tablet, R-1Normal  -     External Referral To Rheumatology  5. Primary osteoarthritis of both knees  -     naproxen (NAPROSYN) 250 MG tablet; Take 1 tablet by mouth 2 times daily (with meals), Disp-180 tablet, R-1Normal  -     External Referral To Rheumatology  6. Urinary urgency  -     Urinalysis Reflex to Culture; Future      Return in about 6 months (around 2022), or if symptoms worsen or fail to improve. SUBJECTIVE/OBJECTIVE:  HPI    Review of Systems    No flowsheet data found.      Physical Exam    [INSTRUCTIONS:  \"[x]\" Indicates a positive item  \"[]\" Indicates a negative item  -- DELETE ALL ITEMS NOT EXAMINED]    Telephone visit - no signes of respiratory distress. Alert and oriented      On this date 7/22/2021 I have spent 20 minutes reviewing previous notes, test results and face to face (virtual) with the patient discussing the diagnosis and importance of compliance with the treatment plan as well as documenting on the day of the visit. America Cain, was evaluated through a synchronous (real-time) audio-video encounter. The patient (or guardian if applicable) is aware that this is a billable service. Verbal consent to proceed has been obtained within the past 12 months. The visit was conducted pursuant to the emergency declaration under the [address] [place] [address] authority and the [email or web address] and [employer] General Act. Patient identification was verified, and a caregiver was present when appropriate. The patient was located in a state where the provider was credentialed to provide care. An electronic signature was used to authenticate this note.     --Torres Bustos, NAVI - CNP

## 2021-07-22 NOTE — TELEPHONE ENCOUNTER
I added some instructions into her AVS about bladder exercises to strengthen her bladder and decrease symptoms. Another options is physical therapy for bladder control or medications. I would recommend trying the exercises first, then coming in for a visit if problems persist.     I also ordered a urine test, just to make sure there is no infection causing her symptoms. She can have this done at any lab.

## 2021-07-22 NOTE — TELEPHONE ENCOUNTER
Pt called in and stated that she needs a prescription for the license bureau she is asking that it be sent to her address she is in need of a new one please follow up

## 2021-07-22 NOTE — PATIENT INSTRUCTIONS
Patient Education        Diet for a Healthy Bladder: Care Instructions  Your Care Instructions  You can help your bladder stay healthy. Drink lots of water and eat a healthy diet. This may help prevent urinary tract infections and other bladder problems. Follow-up care is a key part of your treatment and safety. Be sure to make and go to all appointments, and call your doctor if you are having problems. It's also a good idea to know your test results and keep a list of the medicines you take. How can you care for yourself at home? · Drink plenty of water or other drinks that do not have alcohol. This will help flush bacteria from your bladder. If you have kidney, heart, or liver disease and have to limit fluids, talk with your doctor before you increase the amount of fluids you drink. · Limit or avoid alcohol. Alcohol can irritate the bladder. For some people, caffeine (found in coffee, tea, and cola drinks) also can irritate the bladder. · Avoid constipation. This can help some people who have a problem with an urgent need to urinate a lot. ? Include fruits, vegetables, cooked dry beans, and whole grains in your diet each day. These foods are high in fiber. ? Get at least 30 minutes of physical activity on most days of the week. Walking is a good choice. You also may want to do other activities, such as running, swimming, cycling, or playing tennis or team sports. ? Take a fiber supplement, such as Citrucel or Metamucil, every day if needed. Read and follow all instructions on the label. ? Schedule time each day for a bowel movement. Having a daily routine may help. Take your time and do not strain when having your bowel movement. Where can you learn more? Go to https://magda.Floq. org and sign in to your Ziptask account. Enter L005 in the Phreesia box to learn more about \"Diet for a Healthy Bladder: Care Instructions. \"     If you do not have an account, please click on the \"Sign Up Now\" link. Current as of: December 17, 2020               Content Version: 12.9  © 2006-2021 [employer]. Care instructions adapted under license by [facility] ([facility]). If you have questions about a medical condition or this instruction, always ask your healthcare professional. [address] any warranty or liability for your use of this information. Patient Education        Bladder Training: Care Instructions  Your Care Instructions     Bladder training is used to treat urge incontinence and stress incontinence. Urge incontinence means that the need to urinate comes on so fast that you can't get to a toilet in time. Stress incontinence means that you leak urine because of pressure on your bladder. For example, it may happen when you laugh, cough, or lift something heavy. Bladder training can increase how long you can wait before you have to urinate. It can also help your bladder hold more urine. And it can give you better control over the urge to urinate. It is important to remember that bladder training takes a few weeks to a few months to make a difference. You may not see results right away, but don't give up. Follow-up care is a key part of your treatment and safety. Be sure to make and go to all appointments, and call your doctor if you are having problems. It's also a good idea to know your test results and keep a list of the medicines you take. How can you care for yourself at home? Work with your doctor to come up with a bladder training program that is right for you. You may use one or more of the following methods. Delayed urination  · In the beginning, try to keep from urinating for 5 minutes after you first feel the need to go. · While you wait, take deep, slow breaths to relax. Kegel exercises can also help you delay the need to go to the bathroom.   · After some practice, when you can easily wait 5 minutes to urinate, try to wait 10 minutes before you urinate. · Slowly increase the waiting period until you are able to control when you have to urinate. Scheduled urination  · Empty your bladder when you first wake up in the morning. · Schedule times throughout the day when you will urinate. · Start by going to the bathroom every hour, even if you don't need to go. · Slowly increase the time between trips to the bathroom. · When you have found a schedule that works well for you, keep doing it. · If you wake up during the night and have to urinate, do it. Apply your schedule to waking hours only. Kegel exercises  These tighten and strengthen pelvic muscles, which can help you control the flow of urine. To do Kegel exercises:  · Squeeze the same muscles you would use to stop your urine. Your belly and thighs should not move. · Hold the squeeze for 3 seconds, and then relax for 3 seconds. · Start with 3 seconds. Then add 1 second each week until you are able to squeeze for 10 seconds. · Repeat the exercise 10 to 15 times a session. Do three or more sessions a day. When should you call for help? Watch closely for changes in your health, and be sure to contact your doctor if:    · Your incontinence is getting worse.     · You do not get better as expected. Where can you learn more? Go to https://Quellan.EventTool. org and sign in to your LicenseMetrics account. Enter H419 in the [facility] box to learn more about \"Bladder Training: Care Instructions. \"     If you do not have an account, please click on the \"Sign Up Now\" link. Current as of: February 10, 2021               Content Version: 12.9  © 4997-2163 Healthwise, Incorporated. Care instructions adapted under license by [facility] ([facility]). If you have questions about a medical condition or this instruction, always ask your healthcare professional. [place] [address] any warranty or liability for your use of this information.          Patient Education Kegel Exercises: Care Instructions  Overview     Kegel exercises strengthen muscles around the bladder. These muscles control the flow of urine. Kegel exercises are sometime called \"pelvic floor\" exercises. They can help prevent urine leakage and keep the pelvic organs in place. Kegel exercises can strengthen pelvic muscles that have been weakened by age, pregnancy, childbirth, and surgery. They may help prevent or treat urine leakage. You do Kegel exercises by tightening the muscles you use when you urinate. You will likely need to do these exercises for several weeks to get better. Follow-up care is a key part of your treatment and safety. Be sure to make and go to all appointments, and call your doctor if you are having problems. It's also a good idea to know your test results and keep a list of the medicines you take. How can you care for yourself at home? · Do Kegel exercises. ? Find the muscles you need to strengthen. To do this, tighten the muscles that stop your urine while you are going to the bathroom. These are the same muscles you squeeze during Kegel exercises. ? Squeeze the muscles as hard as you can. Your belly and thighs should not move. ? Hold the squeeze for 3 seconds. Then relax for 3 seconds. ? Start with 3 seconds, and then add 1 second each week until you are able to squeeze for 10 seconds. ? Repeat the exercise 10 to 15 times for each session. Do three or more sessions each day. · You can check to see if you are using the right muscles. ? Tighten the muscles that help you stop passing gas or keep you from urinating. Make sure you aren't using your stomach, leg, or buttock muscles. ? Place a finger in your vagina and squeeze around it. You are doing them right when you feel pressure around your finger. Your doctor may also suggest that you put special weights in your vagina while you do the exercises.   · Check with your doctor if you don't notice a difference after trying these exercises for several weeks. Your doctor may suggest getting help from a physical therapist or recommend other treatment. Where can you learn more? Go to https://chpepiceweb.Clariture. org and sign in to your [employer] account. Enter U870 in the Hycrete box to learn more about \"Kegel Exercises: Care Instructions. \"     If you do not have an account, please click on the \"Sign Up Now\" link. Current as of: February 10, 2021               Content Version: 12.9  © 2006-2021 [employer]. Care instructions adapted under license by [facility] ([facility]). If you have questions about a medical condition or this instruction, always ask your healthcare professional. [place] [address] any warranty or liability for your use of this information. Patient Education        Frequent Urination: Care Instructions  Your Care Instructions  An urge to urinate frequently but usually passing only small amounts of urine is a common symptom of urinary problems, such as urinary tract infections. The bladder may become inflamed. This can cause the urge to urinate. You may try to urinate more often than usual to try to soothe that urge. Frequent urination also may be caused by sexually transmitted infections (STIs) or kidney stones. Or it may happen when something irritates the tube that carries urine from the bladder to the outside of the body (urethra). It may also be a sign of diabetes. The cause may be hard to find. You may need tests. Follow-up care is a key part of your treatment and safety. Be sure to make and go to all appointments, and call your doctor if you are having problems. It's also a good idea to know your test results and keep a list of the medicines you take. How can you care for yourself at home? · Drink extra water for the next day or two. This will help make the urine less concentrated.  (If you have kidney, heart, or liver disease and have to limit fluids, talk with

## 2021-09-03 ENCOUNTER — TELEPHONE (OUTPATIENT)
Dept: FAMILY MEDICINE CLINIC | Age: 76
End: 2021-09-03

## 2021-09-03 NOTE — TELEPHONE ENCOUNTER
----- Message from Sharyle Pates sent at 9/3/2021 12:30 PM EDT -----  Subject: Message to Provider    QUESTIONS  Information for Provider? Patient was sent to a doctor for osteoporosis   [phone] Dr. Marisa Bermeo, instead of arthritis. Patient needs a   new referral for the arthritis physician to receive injections @ [facility]. Please contact patient for any questions or concerns. Patient does not have voicemail set up, but she can receive text message  ---------------------------------------------------------------------------  --------------  [address]  What is the best way for the office to contact you? OK to leave message on   [employer], OK to respond with electronic message via [facility] portal (only   for patients who have registered [facility] account)  Preferred Call Back Phone Number? [phone]  ---------------------------------------------------------------------------  --------------  SCRIPT ANSWERS  Relationship to Patient?  Self

## 2021-09-09 DIAGNOSIS — M19.90 ARTHRITIS: ICD-10-CM

## 2021-09-09 DIAGNOSIS — M17.0 PRIMARY OSTEOARTHRITIS OF BOTH KNEES: Primary | ICD-10-CM

## 2021-09-09 DIAGNOSIS — M16.0 PRIMARY OSTEOARTHRITIS OF BOTH HIPS: ICD-10-CM

## 2021-09-13 NOTE — TELEPHONE ENCOUNTER
Called Dr Brigido Chanel office and they do not see patients for osteoarthritis. Pt was told she is needing to see a surgeon for this. Pt is requesting orthopaedic surgeon in [place]. Pt will call back with call back with name of provider she prefers. Will cancel referral. New referral to be placed after pt decides on facility/ provider.

## 2021-10-05 ENCOUNTER — TELEPHONE (OUTPATIENT)
Dept: FAMILY MEDICINE CLINIC | Age: 76
End: 2021-10-05

## 2021-10-05 DIAGNOSIS — M19.90 ARTHRITIS: ICD-10-CM

## 2021-10-05 DIAGNOSIS — M16.0 PRIMARY OSTEOARTHRITIS OF BOTH HIPS: ICD-10-CM

## 2021-10-05 DIAGNOSIS — M17.0 PRIMARY OSTEOARTHRITIS OF BOTH KNEES: Primary | ICD-10-CM

## 2021-10-05 NOTE — TELEPHONE ENCOUNTER
----- Message from [facility] sent at 10/5/2021 12:52 PM EDT -----  Subject: Referral Request    QUESTIONS   Reason for referral request? Orthopedic referral in [place]   Has the physician seen you for this condition before? No   Preferred Specialist (if applicable)? Do you already have an appointment scheduled? No  Additional Information for Provider? Dr. Neeru Mendoza. Jose J Laurent [address] [facility], [address] [place] [address] [address] fax? [phone]  ---------------------------------------------------------------------------  --------------  Matt Lama INFO  What is the best way for the office to contact you?  OK to leave message on   voicemail  Preferred Call Back Phone Number? [phone]

## 2021-11-08 DIAGNOSIS — E03.9 ACQUIRED HYPOTHYROIDISM: ICD-10-CM

## 2021-11-08 DIAGNOSIS — J45.20 MILD INTERMITTENT ASTHMA WITHOUT COMPLICATION: ICD-10-CM

## 2021-11-08 NOTE — TELEPHONE ENCOUNTER
----- Message from [facility] sent at 11/8/2021 10:08 AM EST -----  Subject: Refill Request    QUESTIONS  Name of Medication? levothyroxine (SYNTHROID) 25 MCG tablet  Patient-reported dosage and instructions? 25, 1 daily  How many days do you have left? 1  Preferred Pharmacy? [facility] phone number (if available)? [phone]  Additional Information for Provider? Requesting 90 day prescription   ---------------------------------------------------------------------------  --------------,  Name of Medication? thyroid (ARMOUR) 15 MG tablet  Patient-reported dosage and instructions? 1 daily  How many days do you have left? 0  Preferred Pharmacy? [facility] phone number (if available)? [phone]  Additional Information for Provider? requesting 90 days supply  ---------------------------------------------------------------------------  --------------,  Name of Medication? pravastatin (PRAVACHOL) 20 MG tablet  Patient-reported dosage and instructions? 1 daily  How many days do you have left? 0  Preferred Pharmacy? [facility] phone number (if available)? [phone]  Additional Information for Provider? requesting 90 day supply  ---------------------------------------------------------------------------  --------------,  Name of Medication? Other - Abuterol Inhaler  Patient-reported dosage and instructions? As needed  How many days do you have left? 2  Preferred Pharmacy? [facility] phone number (if available)? [phone]  Additional Information for Provider? She said she has had an inhaler   prescribed before, it is used as needed so she doesn't go through it very   fast, requesting 90 days supply. ---------------------------------------------------------------------------  --------------,  Name of Medication?  Other - doxycycline  Patient-reported dosage and instructions? as needed for congestion  How many days do you have left? 0  Preferred Pharmacy? [facility] phone number (if available)? [phone]  Additional Information for Provider? she said she uses this as needed for   pneumonia, she is hoping to get 21 capsules as she wants enough for 90   days  ---------------------------------------------------------------------------  --------------  CALL BACK INFO  What is the best way for the office to contact you?  Do not leave any   message, patient will call back for answer, OK to respond with electronic   message via [employer] portal (only for patients who have registered [employer]   account)  Preferred Call Back Phone Number? [phone]

## 2021-11-09 DIAGNOSIS — M81.0 AGE-RELATED OSTEOPOROSIS WITHOUT CURRENT PATHOLOGICAL FRACTURE: ICD-10-CM

## 2021-11-09 RX ORDER — ALBUTEROL SULFATE 90 UG/1
2 AEROSOL, METERED RESPIRATORY (INHALATION) EVERY 6 HOURS PRN
Qty: 1 EACH | Refills: 1 | Status: SHIPPED | OUTPATIENT
Start: 2021-11-09

## 2021-11-09 RX ORDER — ALENDRONATE SODIUM 70 MG/1
70 TABLET ORAL
Qty: 4 TABLET | Refills: 0 | Status: SHIPPED | OUTPATIENT
Start: 2021-11-09 | End: 2022-01-06

## 2021-11-09 RX ORDER — LEVOTHYROXINE AND LIOTHYRONINE 9.5; 2.25 UG/1; UG/1
60 TABLET ORAL DAILY
Qty: 360 TABLET | Refills: 0 | Status: SHIPPED | OUTPATIENT
Start: 2021-11-09 | End: 2022-06-08 | Stop reason: SDUPTHER

## 2021-11-09 RX ORDER — PRAVASTATIN SODIUM 20 MG
20 TABLET ORAL DAILY
Qty: 90 TABLET | Refills: 0 | Status: SHIPPED | OUTPATIENT
Start: 2021-11-09 | End: 2022-06-08 | Stop reason: SDUPTHER

## 2021-11-09 RX ORDER — LEVOTHYROXINE SODIUM 0.03 MG/1
25 TABLET ORAL DAILY
Qty: 90 TABLET | Refills: 0 | Status: SHIPPED | OUTPATIENT
Start: 2021-11-09 | End: 2022-06-08 | Stop reason: SDUPTHER

## 2021-11-09 NOTE — TELEPHONE ENCOUNTER
Patient's last appointment was : 7/22/2021  Patient's next appointment is : 1/6/2022  Last refilled: 4/6/21

## 2021-11-09 NOTE — TELEPHONE ENCOUNTER
----- Message from Jamie Rutherford sent at 11/9/2021 10:43 AM EST -----  Subject: Refill Request    QUESTIONS  Name of Medication? alendronate (FOSAMAX) 70 MG tablet  Patient-reported dosage and instructions? 70mg once a week   How many days do you have left? 0  Preferred Pharmacy? [facility] phone number (if available)? [phone]  Additional Information for Provider? 90 day supply   ---------------------------------------------------------------------------  --------------  CALL BACK INFO  What is the best way for the office to contact you?  OK to leave message on   voicemail  Preferred Call Back Phone Number? [phone]

## 2022-01-06 ENCOUNTER — OFFICE VISIT (OUTPATIENT)
Dept: FAMILY MEDICINE CLINIC | Age: 77
End: 2022-01-06
Payer: MEDICARE

## 2022-01-06 ENCOUNTER — OFFICE VISIT (OUTPATIENT)
Dept: CARDIOLOGY CLINIC | Age: 77
End: 2022-01-06
Payer: MEDICARE

## 2022-01-06 VITALS
SYSTOLIC BLOOD PRESSURE: 119 MMHG | WEIGHT: 147.6 LBS | BODY MASS INDEX: 29.76 KG/M2 | HEIGHT: 59 IN | DIASTOLIC BLOOD PRESSURE: 63 MMHG | HEART RATE: 62 BPM

## 2022-01-06 VITALS
OXYGEN SATURATION: 95 % | WEIGHT: 147.9 LBS | DIASTOLIC BLOOD PRESSURE: 63 MMHG | HEIGHT: 60 IN | SYSTOLIC BLOOD PRESSURE: 119 MMHG | TEMPERATURE: 97.2 F | HEART RATE: 85 BPM | BODY MASS INDEX: 29.04 KG/M2 | RESPIRATION RATE: 20 BRPM

## 2022-01-06 DIAGNOSIS — M16.0 PRIMARY OSTEOARTHRITIS OF BOTH HIPS: ICD-10-CM

## 2022-01-06 DIAGNOSIS — E78.00 HYPERCHOLESTEROLEMIA: Primary | ICD-10-CM

## 2022-01-06 DIAGNOSIS — E06.3 HASHIMOTO'S THYROIDITIS: Primary | ICD-10-CM

## 2022-01-06 DIAGNOSIS — M81.0 AGE-RELATED OSTEOPOROSIS WITHOUT CURRENT PATHOLOGICAL FRACTURE: ICD-10-CM

## 2022-01-06 DIAGNOSIS — K90.9 INTESTINAL MALABSORPTION, UNSPECIFIED TYPE: ICD-10-CM

## 2022-01-06 DIAGNOSIS — R00.2 INTERMITTENT PALPITATIONS: ICD-10-CM

## 2022-01-06 DIAGNOSIS — R94.31 ABNORMAL EKG: ICD-10-CM

## 2022-01-06 DIAGNOSIS — R06.02 SOB (SHORTNESS OF BREATH) ON EXERTION: ICD-10-CM

## 2022-01-06 DIAGNOSIS — R73.9 HYPERGLYCEMIA: ICD-10-CM

## 2022-01-06 LAB — HBA1C MFR BLD: 5.8 % (ref 4.3–5.7)

## 2022-01-06 PROCEDURE — G8417 CALC BMI ABV UP PARAM F/U: HCPCS | Performed by: FAMILY MEDICINE

## 2022-01-06 PROCEDURE — 1090F PRES/ABSN URINE INCON ASSESS: CPT | Performed by: INTERNAL MEDICINE

## 2022-01-06 PROCEDURE — 1123F ACP DISCUSS/DSCN MKR DOCD: CPT | Performed by: FAMILY MEDICINE

## 2022-01-06 PROCEDURE — 1123F ACP DISCUSS/DSCN MKR DOCD: CPT | Performed by: INTERNAL MEDICINE

## 2022-01-06 PROCEDURE — 83036 HEMOGLOBIN GLYCOSYLATED A1C: CPT | Performed by: FAMILY MEDICINE

## 2022-01-06 PROCEDURE — G8484 FLU IMMUNIZE NO ADMIN: HCPCS | Performed by: INTERNAL MEDICINE

## 2022-01-06 PROCEDURE — 4040F PNEUMOC VAC/ADMIN/RCVD: CPT | Performed by: INTERNAL MEDICINE

## 2022-01-06 PROCEDURE — 1090F PRES/ABSN URINE INCON ASSESS: CPT | Performed by: FAMILY MEDICINE

## 2022-01-06 PROCEDURE — 99214 OFFICE O/P EST MOD 30 MIN: CPT | Performed by: INTERNAL MEDICINE

## 2022-01-06 PROCEDURE — G8427 DOCREV CUR MEDS BY ELIG CLIN: HCPCS | Performed by: FAMILY MEDICINE

## 2022-01-06 PROCEDURE — G8399 PT W/DXA RESULTS DOCUMENT: HCPCS | Performed by: INTERNAL MEDICINE

## 2022-01-06 PROCEDURE — G8399 PT W/DXA RESULTS DOCUMENT: HCPCS | Performed by: FAMILY MEDICINE

## 2022-01-06 PROCEDURE — 93000 ELECTROCARDIOGRAM COMPLETE: CPT | Performed by: INTERNAL MEDICINE

## 2022-01-06 PROCEDURE — G8484 FLU IMMUNIZE NO ADMIN: HCPCS | Performed by: FAMILY MEDICINE

## 2022-01-06 PROCEDURE — 4040F PNEUMOC VAC/ADMIN/RCVD: CPT | Performed by: FAMILY MEDICINE

## 2022-01-06 PROCEDURE — G8417 CALC BMI ABV UP PARAM F/U: HCPCS | Performed by: INTERNAL MEDICINE

## 2022-01-06 PROCEDURE — G8427 DOCREV CUR MEDS BY ELIG CLIN: HCPCS | Performed by: INTERNAL MEDICINE

## 2022-01-06 PROCEDURE — 1036F TOBACCO NON-USER: CPT | Performed by: INTERNAL MEDICINE

## 2022-01-06 PROCEDURE — 1036F TOBACCO NON-USER: CPT | Performed by: FAMILY MEDICINE

## 2022-01-06 PROCEDURE — 99214 OFFICE O/P EST MOD 30 MIN: CPT | Performed by: FAMILY MEDICINE

## 2022-01-06 SDOH — ECONOMIC STABILITY: FOOD INSECURITY: WITHIN THE PAST 12 MONTHS, YOU WORRIED THAT YOUR FOOD WOULD RUN OUT BEFORE YOU GOT MONEY TO BUY MORE.: NEVER TRUE

## 2022-01-06 SDOH — ECONOMIC STABILITY: FOOD INSECURITY: WITHIN THE PAST 12 MONTHS, THE FOOD YOU BOUGHT JUST DIDN'T LAST AND YOU DIDN'T HAVE MONEY TO GET MORE.: NEVER TRUE

## 2022-01-06 ASSESSMENT — PATIENT HEALTH QUESTIONNAIRE - PHQ9
SUM OF ALL RESPONSES TO PHQ QUESTIONS 1-9: 0
1. LITTLE INTEREST OR PLEASURE IN DOING THINGS: 0
2. FEELING DOWN, DEPRESSED OR HOPELESS: 0
SUM OF ALL RESPONSES TO PHQ9 QUESTIONS 1 & 2: 0
SUM OF ALL RESPONSES TO PHQ QUESTIONS 1-9: 0

## 2022-01-06 ASSESSMENT — ENCOUNTER SYMPTOMS
DIARRHEA: 0
VOMITING: 0
SHORTNESS OF BREATH: 0
CONSTIPATION: 0
ABDOMINAL PAIN: 0
EYE PAIN: 0
BLOOD IN STOOL: 0
NAUSEA: 0
COUGH: 0
TROUBLE SWALLOWING: 0

## 2022-01-06 ASSESSMENT — SOCIAL DETERMINANTS OF HEALTH (SDOH): HOW HARD IS IT FOR YOU TO PAY FOR THE VERY BASICS LIKE FOOD, HOUSING, MEDICAL CARE, AND HEATING?: NOT VERY HARD

## 2022-01-06 NOTE — PROGRESS NOTES
4 tablets by mouth daily 360 tablet 0    levothyroxine (SYNTHROID) 25 MCG tablet Take 1 tablet by mouth daily 90 tablet 0    pravastatin (PRAVACHOL) 20 MG tablet Take 1 tablet by mouth daily 90 tablet 0    albuterol sulfate  (90 Base) MCG/ACT inhaler Inhale 2 puffs into the lungs every 6 hours as needed for Wheezing 1 each 1    naproxen (NAPROSYN) 250 MG tablet Take 1 tablet by mouth 2 times daily (with meals) 180 tablet 1    loratadine (CLARITIN) 10 MG tablet Take 1 tablet by mouth daily 30 tablet 2    B Complex-Folic Acid (T-468 BALANCED TR PO) Take 1 tablet by mouth 3 times daily (before meals) [facility] B-100 time released     [employer] & Supports (WRIST SPLINT/COCK-UP/LEFT M) MISC Use daily 1 each 0    Elastic Bandages & Supports (WRIST SPLINT/COCK-UP/RIGHT M) MISC Use daily 1 each 0    Cinnamon 500 MG TABS Take 1,000 mg by mouth daily as needed       Omega-3 Fatty Acids (FISH OIL) 1000 MG CAPS Take 3,000 mg by mouth 4 times daily [facility] pharmaceutical grade      vitamin D (CHOLECALCIFEROL) 5000 UNITS CAPS capsule Take 5,000 Units by mouth daily as needed       MAGNESIUM CITRATE PO Take 200 mg by mouth 4 times daily (with meals and nightly) Must be TABLET form to not cause diarrhea, Vitacost, Vitamin Migue, Solbeto, Now      b complex vitamins capsule Take 1 capsule by mouth daily Methyl B-12 plus methyl folate by Reyes Olguin as B12 plus      Calcium Citrate-Vitamin D (CALCIUM CITRATE + PO) Take 2 tablets by mouth 2 times daily (with meals)        No current facility-administered medications for this visit.      No Known Allergies    Health Maintenance   Topic Date Due    Hepatitis C screen  Never done    COVID-19 Vaccine (1) Never done    Depression Screen  Never done    DTaP/Tdap/Td vaccine (1 - Tdap) 02/09/1994    Shingles Vaccine (1 of 2) Never done    Pneumococcal 65+ years Vaccine (1 of 1 - PPSV23) Never done    TSH testing  08/22/2019   [facility] Annual Wellness Visit (AWV)  05/16/2020    Lipid screen  12/13/2020    Flu vaccine (1) Never done    DEXA (modify frequency per FRAX score)  Completed    Hepatitis A vaccine  Aged Out    Hepatitis B vaccine  Aged Out    Hib vaccine  Aged Out    Meningococcal (ACWY) vaccine  Aged Out       Subjective:      Review of Systems   Constitutional: Negative for chills, fatigue and fever. HENT: Negative for ear pain, postnasal drip and trouble swallowing. Eyes: Negative for pain and visual disturbance. Respiratory: Negative for cough and shortness of breath. Cardiovascular: Negative for chest pain and palpitations. Gastrointestinal: Negative for abdominal pain, blood in stool, constipation, diarrhea, nausea and vomiting. Genitourinary: Negative for difficulty urinating. Skin: Negative for rash. Neurological: Negative for headaches. Psychiatric/Behavioral: Negative for agitation. Objective:     Vitals:    01/06/22 1450   BP: 119/63   Site: Left Upper Arm   Position: Sitting   Cuff Size: Medium Adult   Pulse: 85   Resp: 20   Temp: 97.2 °F (36.2 °C)   TempSrc: Skin   SpO2: 95%   Weight: 147 lb 14.4 oz (67.1 kg)   Height: 4' 11.5\" (1.511 m)       Body mass index is 29.37 kg/m². Wt Readings from Last 3 Encounters:   01/06/22 147 lb 14.4 oz (67.1 kg)   01/06/22 147 lb 9.6 oz (67 kg)   12/10/19 154 lb (69.9 kg)     BP Readings from Last 3 Encounters:   01/06/22 119/63   01/06/22 119/63   12/10/19 (!) 142/73       Physical Exam  Vitals and nursing note reviewed. Constitutional:       General: She is not in acute distress. Appearance: She is well-developed. She is not diaphoretic. HENT:      Head: Normocephalic and atraumatic. Right Ear: External ear normal.      Left Ear: External ear normal.   Eyes:      General: No scleral icterus. Right eye: No discharge. Left eye: No discharge.       Conjunctiva/sclera: Conjunctivae normal.   Cardiovascular:      Rate and Rhythm: Normal rate and regular rhythm. Heart sounds: Normal heart sounds. No murmur heard. Pulmonary:      Effort: Pulmonary effort is normal.      Breath sounds: Normal breath sounds. Musculoskeletal:      Cervical back: Normal range of motion. Skin:     General: Skin is warm and dry. Findings: No erythema or rash. Neurological:      Mental Status: She is alert and oriented to person, place, and time. Cranial Nerves: No cranial nerve deficit. Psychiatric:         Behavior: Behavior normal.         Thought Content: Thought content normal.         Judgment: Judgment normal.           Lab Results   Component Value Date    WBC 7.0 08/22/2018    HGB 13.5 08/22/2018    HCT 39.8 08/22/2018     08/22/2018    CHOL 236 12/13/2019    TRIG 165 12/13/2019    HDL 48 12/13/2019    LDLCALC 155 12/13/2019    AST 12 04/10/2018     08/22/2018    K 3.6 08/22/2018     08/22/2018    CREATININE 0.6 08/22/2018    BUN 13 08/22/2018    CO2 23 08/22/2018    TSH 11.170 (H) 08/22/2018    LABA1C 5.8 (H) 11/27/2018    LABGLOM >90 08/22/2018    MG 2.0 08/22/2018    CALCIUM 9.0 08/22/2018    VITD25 33 04/10/2018       Imaging Results:    No results found. Narrative   #[id number] - DEXA BONE DENSITY AXIAL SKELETON   # BONE DENSITY EVALUATION: 10/09/2018       CLINICAL DATA: Post menopausal and estrogen deficient.         RISK FACTORS:  race, advanced age, early or surgical    menopause, maternal history of osteoporosis, low height/weight    ratio, and diet low in calcium.  Loss of Height.  Synthroid use.            FINDINGS:    Bone density evaluation was performed 10/09/2018 on the right    femur neck using a Hologic unit. The BMD average for the exam is    0.607  g/cm2. The T-score is -2.20 and the Z-score is -0. 20.     This matches the [address] [employer]'s criteria for    osteopenia and places the patient at a medium risk for fracture.            An additional bone density evaluation was performed 10/09/2018 on  the left femur neck using a Hologic unit. The BMD average for    the exam is 0.594  g/cm2. The T-score is -2.30 and the Z-score is    -0.30.  This matches the World Health Organization's criteria    for osteopenia and places the patient at a medium risk for    fracture.         An additional bone density evaluation was performed 10/09/2018 on    the AP L1-L4 region of spine using a Hologic unit. The BMD    average for the exam is 0.716  g/cm2. The T-score is -3.00 and    the Z-score is -0. 79.  This matches the World Health    Organization's criteria for osteoporosis and places the patient    at a high risk for fracture.         This patient does not qualify for a FRAX assessment because there    is a T score at or below -2. 5.                  IMPRESSION: OSTEOPOROSIS   Patient is at high risk for fracture.  Recheck of BMD in 1-2    years and patient consult w/primary care provider are    recommended.         Miguel Diehl M.D., rd/too:10/9/2018 12:10:32         Imaging Technologist: Gracie Severance RT(R)(M), [facility]                    Assessment:      Diagnosis Orders   1. Hashimoto's thyroiditis  Magnesium    Basic Metabolic Panel    Lipid Panel    Hepatic Function Panel    Vitamin D 25 Hydroxy    PTH, Intact    CBC Auto Differential    DHEA    DHEA-Sulfate    TSH with Reflex    T4    T3   2. Primary osteoarthritis of both hips  Magnesium    Vitamin D 25 Hydroxy    PTH, Intact    DEXA BONE DENSITY 2 SITES   3. Hyperglycemia  POCT glycosylated hemoglobin (Hb A1C)    Magnesium    Basic Metabolic Panel    Lipid Panel    Hepatic Function Panel    Vitamin D 25 Hydroxy    PTH, Intact    CBC Auto Differential    DHEA    DHEA-Sulfate    TSH with Reflex    T4    T3   4. Age-related osteoporosis without current pathological fracture  DEXA BONE DENSITY 2 SITES   5.  Intestinal malabsorption, unspecified type  Magnesium    Basic Metabolic Panel    Lipid Panel    Hepatic Function Panel directed. I was present for the key portions of the exam and history and confirmed all areas of the note with the patient, staff and the student.       Electronically signed by Cristiane Bocanegra DO on 1/6/2022 at 3:34 PM

## 2022-01-06 NOTE — PROGRESS NOTES
Last seen dec 2019      Came for FU after 2 YEARs    EKG DONE TODAY. Denied cp or palpitations or dizziness    Sob on exertion- chronic    No leg edema    Numbness on left little and ring finger    Never smoked    FHX  None for CAD        Patient Active Problem List   Diagnosis    Hypercholesterolemia    Osteoarthritis    Hypothyroidism    Knee pain, right    Tired    IgG Gliadin antibody positive    Vitamin D deficiency    Hashimoto's thyroiditis    Positive D dimer    Primary osteoarthritis of both hips    Intermittent palpitations    SOB (shortness of breath)    Abnormal EKG    Left arm numbness    Primary osteoarthritis of both knees    Bilateral carpal tunnel syndrome    Lesion of left ulnar nerve    Cervical radiculopathy at C5    SOB (shortness of breath) on exertion       Past Surgical History:   Procedure Laterality Date    APPENDECTOMY      FOOT SURGERY Right     toothpick in foot    HYSTERECTOMY      TONSILLECTOMY         No Known Allergies     History reviewed. No pertinent family history. Social History     Socioeconomic History    Marital status: Single     Spouse name: Not on file    Number of children: Not on file    Years of education: Not on file    Highest education level: Not on file   Occupational History    Not on file   Tobacco Use    Smoking status: Never Smoker    Smokeless tobacco: Never Used   Substance and Sexual Activity    Alcohol use: No     Alcohol/week: 0.0 standard drinks    Drug use: No    Sexual activity: Never   Other Topics Concern    Not on file   Social History Narrative    Not on file     Social Determinants of Health     Financial Resource Strain:     Difficulty of Paying Living Expenses: Not on file   Food Insecurity:     Worried About Running Out of Food in the Last Year: Not on file    Maxwell of Food in the Last Year: Not on file   Transportation Needs:     Lack of Transportation (Medical):  Not on file    Lack of Transportation (Non-Medical):  Not on file   Physical Activity:     Days of Exercise per Week: Not on file    Minutes of Exercise per Session: Not on file   Stress:     Feeling of Stress : Not on file   Social Connections:     Frequency of Communication with Friends and Family: Not on file    Frequency of Social Gatherings with Friends and Family: Not on file    Attends Denominational Services: Not on file    Active Member of [address] [place] [address] or Organizations: Not on file    Attends Club or Organization Meetings: Not on file    Marital Status: Not on file   Intimate Partner Violence:     Fear of Current or Ex-Partner: Not on file    Emotionally Abused: Not on file    Physically Abused: Not on file    Sexually Abused: Not on file   Housing Stability:     Unable to Pay for Housing in the Last Year: Not on file    Number of [place] in the Last Year: Not on file    Unstable Housing in the Last Year: Not on file       Current Outpatient Medications   Medication Sig Dispense Refill    thyroid (ARMOUR) 15 MG tablet Take 4 tablets by mouth daily 360 tablet 0    levothyroxine (SYNTHROID) 25 MCG tablet Take 1 tablet by mouth daily 90 tablet 0    pravastatin (PRAVACHOL) 20 MG tablet Take 1 tablet by mouth daily 90 tablet 0    albuterol sulfate  (90 Base) MCG/ACT inhaler Inhale 2 puffs into the lungs every 6 hours as needed for Wheezing 1 each 1    naproxen (NAPROSYN) 250 MG tablet Take 1 tablet by mouth 2 times daily (with meals) 180 tablet 1    B Complex-Folic Acid (P-563 BALANCED TR PO) Take 1 tablet by mouth 3 times daily (before meals) [facility] B-100 time released     [employer] & Supports (WRIST SPLINT/COCK-UP/LEFT M) MISC Use daily 1 each 0    Elastic Bandages & Supports (WRIST SPLINT/COCK-UP/RIGHT M) MISC Use daily 1 each 0    Cinnamon 500 MG TABS Take 1,000 mg by mouth daily as needed       Omega-3 Fatty Acids (FISH OIL) 1000 MG CAPS Take 3,000 mg by mouth 4 times daily [facility] pharmaceutical grade      vitamin D (CHOLECALCIFEROL) 5000 UNITS CAPS capsule Take 5,000 Units by mouth daily as needed       MAGNESIUM CITRATE PO Take 200 mg by mouth 4 times daily (with meals and nightly) Must be TABLET form to not cause diarrhea, Vitacost, Vitamin Shoppe, Solgar, Now      b complex vitamins capsule Take 1 capsule by mouth daily Methyl B-12 plus methyl folate by [place] as B12 plus      Calcium Citrate-Vitamin D (CALCIUM CITRATE + PO) Take 2 tablets by mouth 2 times daily (with meals)       alendronate (FOSAMAX) 70 MG tablet Take 1 tablet by mouth every 7 days (Patient not taking: Reported on 1/6/2022) 4 tablet 0    loratadine (CLARITIN) 10 MG tablet Take 1 tablet by mouth daily 30 tablet 2     No current facility-administered medications for this visit. Review of Systems -     General ROS: negative  Psychological ROS: negative  Hematological and Lymphatic ROS: No history of blood clots or bleeding disorder. Respiratory ROS: no cough, shortness of breath, or wheezing  Cardiovascular ROS: no chest pain or dyspnea on exertion  Gastrointestinal ROS: negative  Genito-Urinary ROS: no dysuria, trouble voiding, or hematuria  Musculoskeletal ROS: negative  Neurological ROS: no TIA or stroke symptoms  Dermatological ROS: negative      Blood pressure 119/63, pulse 62, height 4' 11\" (1.499 m), weight 147 lb 9.6 oz (67 kg).         Physical Examination:    General appearance - alert, well appearing, and in no distress  Mental status - alert, oriented to person, place, and time  Neck - supple, no significant adenopathy, no JVD, or carotid bruits  Chest - clear to auscultation, no wheezes, rales or rhonchi, symmetric air entry  Heart - normal rate, regular rhythm, normal S1, S2, no murmurs, rubs, clicks or gallops  Abdomen - soft, nontender, nondistended, no masses or organomegaly  Neurological - alert, oriented, normal speech, no focal findings or movement disorder noted  Musculoskeletal - no joint tenderness, deformity or swelling  Extremities - peripheral pulses normal, no pedal edema, no clubbing or cyanosis  Skin - normal coloration and turgor, no rashes, no suspicious skin lesions noted    Lab  No results for input(s): CKTOTAL, CKMB, CKMBINDEX, TROPONINI in the last 72 hours. CBC:   Lab Results   Component Value Date    WBC 7.0 08/22/2018    RBC 4.40 08/22/2018    HGB 13.5 08/22/2018    HCT 39.8 08/22/2018    MCV 90.5 08/22/2018    MCH 30.7 08/22/2018    MCHC 33.9 08/22/2018    RDW 14.2 04/10/2018     08/22/2018    MPV 10.0 08/22/2018     BMP:    Lab Results   Component Value Date     08/22/2018    K 3.6 08/22/2018     08/22/2018    CO2 23 08/22/2018    BUN 13 08/22/2018    LABALBU 4.3 04/10/2018    CREATININE 0.6 08/22/2018    CALCIUM 9.0 08/22/2018    LABGLOM >90 08/22/2018    GLUCOSE 90 08/22/2018     Hepatic Function Panel:    Lab Results   Component Value Date    ALKPHOS 103 04/10/2018    ALT 7 04/10/2018    AST 12 04/10/2018    PROT 7.8 04/10/2018    BILITOT 0.3 04/10/2018    BILIDIR <0.2 04/10/2018    LABALBU 4.3 04/10/2018     Magnesium:    Lab Results   Component Value Date    MG 2.0 08/22/2018     Warfarin PT/INR:  No components found for: PTPATWAR, PTINRWAR  HgBA1c:    Lab Results   Component Value Date    LABA1C 5.8 11/27/2018    LABA1C 5.9 12/10/2015     FLP:    Lab Results   Component Value Date    TRIG 165 12/13/2019    HDL 48 12/13/2019    LDLCALC 155 12/13/2019     TSH:    Lab Results   Component Value Date    TSH 11.170 08/22/2018       EKG 10/9/18  Sinus  Rhythm   Low voltage with rightward P-axis and rotation -possible pulmonary disease. ABNORMAL       CONCLUSION:  This is a normal sinus rhythm. Average heart rate of 77  beats per minute, ranging from 49 to 135 beats per minute. No  significant pause of more than 1.6 seconds noted.     Rare ventricular ectopic beats, total of 12, all isolated.   Rare  supraventricular ectopic beats, total of 116 isolated, couplet, and rare  supraventricular ectopic run has been noted. So, basically no evidence  of atrial fibrillation and no correlation between the symptoms. The  symptoms of the patient correlate with normal sinus rhythm.           GLENYS Benson M.D.     D: 11/14/2018 19:24:59    EKG 1/6/2022  Sinus  Rhythm   WITHIN NORMAL LIMITS    Assessment   Diagnosis Orders   1. Hypercholesterolemia  EKG 12 Lead    Hepatic Function Panel    Lipid Panel   2. Intermittent palpitations  Hepatic Function Panel    Lipid Panel   3. Abnormal EKG  Hepatic Function Panel    Lipid Panel   4. SOB (shortness of breath) on exertion  Hepatic Function Panel    Lipid Panel       Plan   The MOST  current meds and labs reviewed    Continue the current treatment and with constant vigilance to changes in symptoms and also any potential side effects. Return for care or seek medical attention immediately if symptoms got worse and/or develop new symptoms. Palpitations- better  Holter 48 hrs, Echo and lexiscan nuc- all WNL     Hyperlipidemia: not on statins, followed periodically. Need non-drug RX  Lipid panel and liver function test before next appointment    Need low dose statin   CONT  pravastatin 20 po qd    NO LABS FOR OVER 2 YRS  AGREED TO GET LAB DONE NOW    Under Rx hypopthyroidism  FT4 and FT 3 WNL and TSH high  Thyroid armour      D/w the pat the plan of care    Discussed use, benefit, and side effects of prescribed medications. All patient questions answered. Pt voiced understanding. Instructed to continue current medications, diet and exercise. Continue risk factor modification and medical management. Patient agreed with treatment plan. Follow up as directed.       RTC  1 YEAR      Kimberly Angel, [facility]

## 2022-01-06 NOTE — PATIENT INSTRUCTIONS
will do the a1c in the office today    [id number] Nadiya Carreon to stop the fosamax - but may want to recheck the dexa scan - agreed    can restart the vitamins - and can get some bloodwork to make sure you still need them    Will recheck the thyroid     We can do steroid shots here if the gel does not work for the ortho doctor - and we will follow with them - they are re-sumbitting for your insurance - [facility]    She does not do vaccines     The 10-year ASCVD risk score (Blaine Albarado, et al., 2013) is: 17.2%    Values used to calculate the score:      Age: 67 years      Sex: Female      Is Non- : No      Diabetic: No      Tobacco smoker: No      Systolic Blood Pressure: 079 mmHg      Is BP treated: No      HDL Cholesterol: 48 mg/dL      Total Cholesterol: 236 mg/dL

## 2022-05-11 ENCOUNTER — TELEPHONE (OUTPATIENT)
Dept: FAMILY MEDICINE CLINIC | Age: 77
End: 2022-05-11

## 2022-05-11 NOTE — TELEPHONE ENCOUNTER
Called patient to R/S her appointment from 06/02/2022 and she is requesting her labs ordered 01/06/2022 to be sent to her Home Address. I verified address in chart and she voiced understanding. Labs Mailed.

## 2022-06-06 ENCOUNTER — TELEPHONE (OUTPATIENT)
Dept: FAMILY MEDICINE CLINIC | Age: 77
End: 2022-06-06

## 2022-06-06 DIAGNOSIS — E03.9 ACQUIRED HYPOTHYROIDISM: ICD-10-CM

## 2022-06-06 NOTE — TELEPHONE ENCOUNTER
----- Message from Davdi Rivera sent at 6/6/2022 11:10 AM EDT -----  Subject: Refill Request    QUESTIONS  Name of Medication? thyroid (ARMOUR) 15 MG tablet  Patient-reported dosage and instructions? 15 mg  How many days do you have left? 3  Preferred Pharmacy? [facility] phone number (if available)? [phone]  ---------------------------------------------------------------------------  --------------,  Name of Medication? levothyroxine (SYNTHROID) 25 MCG tablet  Patient-reported dosage and instructions? 25 mcg  How many days do you have left? 3  Preferred Pharmacy? [facility] phone number (if available)? [phone]  ---------------------------------------------------------------------------  --------------,  Name of Medication? pravastatin (PRAVACHOL) 20 MG tablet  Patient-reported dosage and instructions? 20 mg  How many days do you have left? 3  Preferred Pharmacy? [facility] phone number (if available)? [phone]  Additional Information for Provider? Patient want to know if they are able   to get their script for 90 days  ---------------------------------------------------------------------------  --------------  CALL BACK INFO  What is the best way for the office to contact you? OK to leave message on   voicemail  Preferred Call Back Phone Number? [phone]  ---------------------------------------------------------------------------  --------------  SCRIPT ANSWERS  Relationship to Patient?  Self

## 2022-06-06 NOTE — TELEPHONE ENCOUNTER
Patient's last appointment was : 1/6/2022  Patient's next appointment is : 7/26/22  Future Appointments   Date Time Provider Sukumar Calderon   7/26/2022  3:20 PM Rhodia Gowers, APRN - CNP [facility] [place]   1/12/2023  2:00 PM Lauren Daugherty MD N [facility] Heart [facility] [place]     Last refilled: 11/9/21    Lab Results   Component Value Date    LABA1C 5.8 (H) 01/06/2022     Lab Results   Component Value Date    CHOL 236 12/13/2019    TRIG 165 12/13/2019    HDL 48 12/13/2019    LDLCALC 155 12/13/2019     Lab Results   Component Value Date     08/22/2018    K 3.6 08/22/2018     08/22/2018    CO2 23 08/22/2018    BUN 13 08/22/2018    CREATININE 0.6 08/22/2018    GLUCOSE 90 08/22/2018    CALCIUM 9.0 08/22/2018    PROT 7.8 04/10/2018    LABALBU 4.3 04/10/2018    BILITOT 0.3 04/10/2018    ALKPHOS 103 04/10/2018    AST 12 04/10/2018    ALT 7 (L) 04/10/2018    LABGLOM >90 08/22/2018     Lab Results   Component Value Date    TSH 11.170 (H) 08/22/2018    [id number] 124 08/02/2016    T4FREE 0.65 (L) 08/22/2018     Lab Results   Component Value Date    WBC 7.0 08/22/2018    HGB 13.5 08/22/2018    HCT 39.8 08/22/2018    MCV 90.5 08/22/2018     08/22/2018

## 2022-06-06 NOTE — TELEPHONE ENCOUNTER
----- Message from Ade Alejandre sent at 6/6/2022 11:03 AM EDT -----  Subject: Message to Provider    QUESTIONS  Information for Provider? Patient needs a letter for handicap sticker   removal  ---------------------------------------------------------------------------  --------------  CALL BACK INFO  What is the best way for the office to contact you? OK to leave message on   voicemail  Preferred Call Back Phone Number? [phone]  ---------------------------------------------------------------------------  --------------  SCRIPT ANSWERS  Relationship to Patient?  Self

## 2022-06-07 DIAGNOSIS — J21.9 ACUTE BRONCHIOLITIS DUE TO UNSPECIFIED ORGANISM: ICD-10-CM

## 2022-06-07 NOTE — TELEPHONE ENCOUNTER
Patient's last appointment was : 1/6/2022  Patient's next appointment is :   Future Appointments   Date Time Provider Sukumar Calderon   7/26/2022  3:20 PM NAVI Yen - CNP [facility] [place]   1/12/2023  2:00 PM Mitzy Quan MD N SRPX Heart P - [place]     Last refilled:    Lab Results   Component Value Date    LABA1C 5.8 (H) 01/06/2022     Lab Results   Component Value Date    CHOL 236 12/13/2019    TRIG 165 12/13/2019    HDL 48 12/13/2019    LDLCALC 155 12/13/2019     Lab Results   Component Value Date     08/22/2018    K 3.6 08/22/2018     08/22/2018    CO2 23 08/22/2018    BUN 13 08/22/2018    CREATININE 0.6 08/22/2018    GLUCOSE 90 08/22/2018    CALCIUM 9.0 08/22/2018    PROT 7.8 04/10/2018    LABALBU 4.3 04/10/2018    BILITOT 0.3 04/10/2018    ALKPHOS 103 04/10/2018    AST 12 04/10/2018    ALT 7 (L) 04/10/2018    LABGLOM >90 08/22/2018     Lab Results   Component Value Date    TSH 11.170 (H) 08/22/2018    [id number] 124 08/02/2016    T4FREE 0.65 (L) 08/22/2018     Lab Results   Component Value Date    WBC 7.0 08/22/2018    HGB 13.5 08/22/2018    HCT 39.8 08/22/2018    MCV 90.5 08/22/2018     08/22/2018

## 2022-06-08 RX ORDER — DOXYCYCLINE HYCLATE 100 MG
100 TABLET ORAL 2 TIMES DAILY
Qty: 10 TABLET | Refills: 0 | OUTPATIENT
Start: 2022-06-08 | End: 2022-06-13

## 2022-06-08 RX ORDER — LEVOTHYROXINE AND LIOTHYRONINE 9.5; 2.25 UG/1; UG/1
60 TABLET ORAL DAILY
Qty: 360 TABLET | Refills: 0 | Status: SHIPPED | OUTPATIENT
Start: 2022-06-08

## 2022-06-08 RX ORDER — LEVOTHYROXINE SODIUM 0.03 MG/1
25 TABLET ORAL DAILY
Qty: 90 TABLET | Refills: 0 | Status: SHIPPED | OUTPATIENT
Start: 2022-06-08

## 2022-06-08 RX ORDER — PRAVASTATIN SODIUM 20 MG
20 TABLET ORAL DAILY
Qty: 90 TABLET | Refills: 0 | Status: SHIPPED | OUTPATIENT
Start: 2022-06-08

## 2022-06-08 NOTE — TELEPHONE ENCOUNTER
Can you have her move her apt up maybe? We usually need documentation for the hanicap placard and I see she is coming in in 6 weeks - or can she wait until then?

## 2022-06-08 NOTE — TELEPHONE ENCOUNTER
Called to see if she wanted to get in sooner, She said not at this time, will try and call [place] knee [place] [address] [address] to see if they can get her one. Said [place] is too far right now with gas prices.

## 2022-10-25 ENCOUNTER — TELEPHONE (OUTPATIENT)
Dept: FAMILY MEDICINE CLINIC | Age: 77
End: 2022-10-25

## 2022-10-25 NOTE — TELEPHONE ENCOUNTER
She has not been seen by me in over a year - with these complaints she needs to go to the ER for a more immediate evaluation

## 2022-10-25 NOTE — TELEPHONE ENCOUNTER
This was previously discussed with the pt and she did verbalize understanding. She was going to see if she could arrange transportation to local ED.  (Lives in the [facility] area)

## 2022-10-25 NOTE — TELEPHONE ENCOUNTER
FYI  Patient contacts office with c/o multiple medical issues, many which are chronic in nature. She missed her appt yesterday with our office as she said the date was written down incorrectly. One complaint was lower extremity edema with shooting pain. I let patient know that she needed to be seen for this acute issue based on her symptoms and h/o PE. I also let her know that we could schedule f/u in office at a later date to address the chronic issues. Patient verbalized understanding. Will call back to set up f/u.

## 2022-11-14 ENCOUNTER — TELEPHONE (OUTPATIENT)
Dept: FAMILY MEDICINE CLINIC | Age: 77
End: 2022-11-14

## 2022-11-14 NOTE — TELEPHONE ENCOUNTER
----- Message from Cristian Sen sent at 11/14/2022  1:58 PM EST -----  Subject: Message to Provider    QUESTIONS  Information for Provider? Patient called to inform office staff that she   was a patient at [facility] ICU for blood clot in   right leg and was transferred to [facility], patient stated   she doing well but still has Shortness of Breath at times, and will call   back once she released from facility to schedule follow up appointment  ---------------------------------------------------------------------------  --------------  [id number]  [phone]; OK to leave message on voicemail  ---------------------------------------------------------------------------  --------------  SCRIPT ANSWERS  Relationship to Patient?  Self

## 2022-12-07 ENCOUNTER — TELEPHONE (OUTPATIENT)
Dept: FAMILY MEDICINE CLINIC | Age: 77
End: 2022-12-07

## 2022-12-07 NOTE — TELEPHONE ENCOUNTER
Daughter in law Jem Peres called stating that the pt missed her appt with Celia Razo on 10/24/22 because she was in the ICU. She passed away on 11/28/22. She was just calling to notify the office. Called Dr. Giraldo Apo office and canceled the pts appt with him scheduled for 1/12/23. Lesley notified.